# Patient Record
Sex: FEMALE | Race: BLACK OR AFRICAN AMERICAN | NOT HISPANIC OR LATINO | Employment: FULL TIME | URBAN - METROPOLITAN AREA
[De-identification: names, ages, dates, MRNs, and addresses within clinical notes are randomized per-mention and may not be internally consistent; named-entity substitution may affect disease eponyms.]

---

## 2017-11-10 ENCOUNTER — TRANSCRIBE ORDERS (OUTPATIENT)
Dept: ADMINISTRATIVE | Facility: HOSPITAL | Age: 52
End: 2017-11-10

## 2017-11-10 DIAGNOSIS — Z12.31 VISIT FOR SCREENING MAMMOGRAM: Primary | ICD-10-CM

## 2017-12-14 ENCOUNTER — APPOINTMENT (OUTPATIENT)
Dept: PREADMISSION TESTING | Facility: HOSPITAL | Age: 52
End: 2017-12-14

## 2017-12-26 ENCOUNTER — HOSPITAL ENCOUNTER (OUTPATIENT)
Dept: MAMMOGRAPHY | Facility: HOSPITAL | Age: 52
Discharge: HOME | End: 2017-12-26
Admitting: NURSE PRACTITIONER

## 2017-12-26 DIAGNOSIS — Z12.31 VISIT FOR SCREENING MAMMOGRAM: ICD-10-CM

## 2017-12-26 PROCEDURE — 77063 BREAST TOMOSYNTHESIS BI: CPT

## 2017-12-26 PROCEDURE — 77063 BREAST TOMOSYNTHESIS BI: CPT | Performed by: RADIOLOGY

## 2017-12-26 PROCEDURE — G0202 SCR MAMMO BI INCL CAD: HCPCS

## 2017-12-26 PROCEDURE — 77067 SCR MAMMO BI INCL CAD: CPT | Performed by: RADIOLOGY

## 2017-12-28 ENCOUNTER — OFFICE VISIT (OUTPATIENT)
Dept: FAMILY MEDICINE CLINIC | Facility: CLINIC | Age: 52
End: 2017-12-28

## 2017-12-28 ENCOUNTER — LAB (OUTPATIENT)
Dept: LAB | Facility: HOSPITAL | Age: 52
End: 2017-12-28

## 2017-12-28 VITALS
DIASTOLIC BLOOD PRESSURE: 78 MMHG | OXYGEN SATURATION: 97 % | HEIGHT: 65 IN | TEMPERATURE: 96.8 F | SYSTOLIC BLOOD PRESSURE: 108 MMHG | HEART RATE: 78 BPM | WEIGHT: 202 LBS | BODY MASS INDEX: 33.66 KG/M2

## 2017-12-28 DIAGNOSIS — H69.83 EUSTACHIAN TUBE DYSFUNCTION, BILATERAL: ICD-10-CM

## 2017-12-28 DIAGNOSIS — R79.89 LOW VITAMIN D LEVEL: ICD-10-CM

## 2017-12-28 DIAGNOSIS — E66.09 CLASS 1 OBESITY DUE TO EXCESS CALORIES WITHOUT SERIOUS COMORBIDITY WITH BODY MASS INDEX (BMI) OF 33.0 TO 33.9 IN ADULT: ICD-10-CM

## 2017-12-28 DIAGNOSIS — Z00.00 WELLNESS EXAMINATION: ICD-10-CM

## 2017-12-28 DIAGNOSIS — M51.36 DDD (DEGENERATIVE DISC DISEASE), LUMBAR: ICD-10-CM

## 2017-12-28 DIAGNOSIS — E78.5 DYSLIPIDEMIA: ICD-10-CM

## 2017-12-28 DIAGNOSIS — L65.9 ALOPECIA: ICD-10-CM

## 2017-12-28 DIAGNOSIS — Z00.00 WELLNESS EXAMINATION: Primary | ICD-10-CM

## 2017-12-28 DIAGNOSIS — Z11.59 NEED FOR HEPATITIS C SCREENING TEST: ICD-10-CM

## 2017-12-28 PROBLEM — H69.93 EUSTACHIAN TUBE DYSFUNCTION, BILATERAL: Status: ACTIVE | Noted: 2017-12-28

## 2017-12-28 PROBLEM — M51.369 DDD (DEGENERATIVE DISC DISEASE), LUMBAR: Status: ACTIVE | Noted: 2017-12-28

## 2017-12-28 PROBLEM — E66.811 CLASS 1 OBESITY DUE TO EXCESS CALORIES WITHOUT SERIOUS COMORBIDITY WITH BODY MASS INDEX (BMI) OF 33.0 TO 33.9 IN ADULT: Status: ACTIVE | Noted: 2017-12-28

## 2017-12-28 LAB
25(OH)D3 SERPL-MCNC: 98.4 NG/ML
ALBUMIN SERPL-MCNC: 4.4 G/DL (ref 3.2–4.8)
ALBUMIN/GLOB SERPL: 1.4 G/DL (ref 1.5–2.5)
ALP SERPL-CCNC: 67 U/L (ref 25–100)
ALT SERPL W P-5'-P-CCNC: 19 U/L (ref 7–40)
ANION GAP SERPL CALCULATED.3IONS-SCNC: 7 MMOL/L (ref 3–11)
ARTICHOKE IGE QN: 125 MG/DL (ref 0–130)
AST SERPL-CCNC: 19 U/L (ref 0–33)
BILIRUB SERPL-MCNC: 0.7 MG/DL (ref 0.3–1.2)
BUN BLD-MCNC: 17 MG/DL (ref 9–23)
BUN/CREAT SERPL: 17 (ref 7–25)
CALCIUM SPEC-SCNC: 9.5 MG/DL (ref 8.7–10.4)
CHLORIDE SERPL-SCNC: 105 MMOL/L (ref 99–109)
CHOLEST SERPL-MCNC: 209 MG/DL (ref 0–200)
CO2 SERPL-SCNC: 27 MMOL/L (ref 20–31)
CREAT BLD-MCNC: 1 MG/DL (ref 0.6–1.3)
GFR SERPL CREATININE-BSD FRML MDRD: 71 ML/MIN/1.73
GLOBULIN UR ELPH-MCNC: 3.2 GM/DL
GLUCOSE BLD-MCNC: 90 MG/DL (ref 70–100)
HCV AB SER DONR QL: NORMAL
HDLC SERPL-MCNC: 67 MG/DL (ref 40–60)
POTASSIUM BLD-SCNC: 4.8 MMOL/L (ref 3.5–5.5)
PROT SERPL-MCNC: 7.6 G/DL (ref 5.7–8.2)
SODIUM BLD-SCNC: 139 MMOL/L (ref 132–146)
TRIGL SERPL-MCNC: 99 MG/DL (ref 0–150)

## 2017-12-28 PROCEDURE — 36415 COLL VENOUS BLD VENIPUNCTURE: CPT

## 2017-12-28 PROCEDURE — 80061 LIPID PANEL: CPT

## 2017-12-28 PROCEDURE — 99396 PREV VISIT EST AGE 40-64: CPT | Performed by: NURSE PRACTITIONER

## 2017-12-28 PROCEDURE — 86803 HEPATITIS C AB TEST: CPT | Performed by: NURSE PRACTITIONER

## 2017-12-28 PROCEDURE — 80053 COMPREHEN METABOLIC PANEL: CPT

## 2017-12-28 PROCEDURE — 82306 VITAMIN D 25 HYDROXY: CPT

## 2017-12-28 RX ORDER — SPIRONOLACTONE 50 MG/1
150 TABLET, FILM COATED ORAL DAILY
COMMUNITY
Start: 2017-12-15

## 2017-12-28 RX ORDER — TERCONAZOLE 80 MG/1
SUPPOSITORY VAGINAL AS NEEDED
COMMUNITY
Start: 2017-12-02 | End: 2020-01-03

## 2017-12-28 RX ORDER — FLUTICASONE PROPIONATE 50 MCG
1 SPRAY, SUSPENSION (ML) NASAL DAILY
Qty: 3 BOTTLE | Refills: 3 | Status: SHIPPED | OUTPATIENT
Start: 2017-12-28 | End: 2020-02-21 | Stop reason: SDUPTHER

## 2017-12-28 RX ORDER — ESTRADIOL 0.05 MG/D
FILM, EXTENDED RELEASE TRANSDERMAL
COMMUNITY
Start: 2017-12-02

## 2017-12-28 RX ORDER — FLUTICASONE PROPIONATE 50 MCG
SPRAY, SUSPENSION (ML) NASAL
COMMUNITY
Start: 2017-10-10 | End: 2017-12-28 | Stop reason: SDUPTHER

## 2017-12-28 RX ORDER — CLOBETASOL PROPIONATE 0.46 MG/ML
SOLUTION TOPICAL
COMMUNITY
Start: 2017-12-15 | End: 2018-08-13

## 2017-12-28 NOTE — PROGRESS NOTES
"      Patient Care Team:  ALEJA Garcia as PCP - General (Family Medicine)     Chief complaint: Patient is in today for a physical     Patient is a 52 y.o. female who presents for her yearly physical exam.     HPI   Pt in for yearly physical. Denies c/o.    Review of Systems   Constitutional: Negative for appetite change, chills, fatigue and fever.   HENT: Positive for congestion. Negative for ear pain, rhinorrhea, sinus pressure and sore throat.    Eyes: Negative for itching and visual disturbance.   Respiratory: Negative for cough, shortness of breath and wheezing.    Cardiovascular: Negative for chest pain, palpitations and leg swelling.   Gastrointestinal: Negative for abdominal pain, constipation, diarrhea, nausea and vomiting.   Endocrine: Negative for cold intolerance and heat intolerance.   Genitourinary: Negative for difficulty urinating, dysuria and hematuria.   Musculoskeletal: Positive for arthralgias (right shoulder) and back pain ( Low back pain, into right leg, Tylenol helps some, Ibuprofen hurts stomach, \"Needs surg. not want at this time\". ). Negative for joint swelling and myalgias.   Skin: Positive for rash. Negative for wound.   Allergic/Immunologic: Negative for environmental allergies and food allergies.   Neurological: Positive for numbness (right leg). Negative for dizziness and headaches.   Hematological: Negative for adenopathy. Does not bruise/bleed easily.   Psychiatric/Behavioral: Positive for sleep disturbance (back pain, with numbness into right foot). The patient is not nervous/anxious.       History  Past Medical History:   Diagnosis Date   • DDD (degenerative disc disease), lumbar    • History of gallstones    • Obesity       Past Surgical History:   Procedure Laterality Date   • TOE SURGERY Left     Fifth toe      Allergies   Allergen Reactions   • Mobic [Meloxicam] GI Intolerance   • Nsaids GI Intolerance      Family History   Problem Relation Age of Onset   • Lung " cancer Mother    • Stomach cancer Father    • Heart disease Brother    • Breast cancer Neg Hx    • Ovarian cancer Neg Hx      Social History     Social History   • Marital status: Single     Spouse name: N/A   • Number of children: N/A   • Years of education: N/A     Occupational History   • Not on file.     Social History Main Topics   • Smoking status: Never Smoker   • Smokeless tobacco: Never Used   • Alcohol use Yes      Comment: Rarely   • Drug use: No   • Sexual activity: Defer     Other Topics Concern   • Not on file     Social History Narrative        Current Outpatient Prescriptions:   •  clobetasol (TEMOVATE) 0.05 % external solution, , Disp: , Rfl:   •  estradiol (MINIVELLE, VIVELLE-DOT) 0.05 MG/24HR patch, , Disp: , Rfl:   •  fluticasone (FLONASE) 50 MCG/ACT nasal spray, 1 spray into each nostril Daily., Disp: 3 bottle, Rfl: 3  •  progesterone (PROMETRIUM) 100 MG capsule, , Disp: , Rfl:   •  spironolactone (ALDACTONE) 50 MG tablet, , Disp: , Rfl:   •  terconazole (TERAZOL 3) 80 MG vaginal suppository, , Disp: , Rfl:     Immunizations   N/A   Prescribed/Refused   Date     Td/Tdap  (Booster Q 10 yrs)   [x]           Prescribed    []     Refused        []           Flu  (Yearly)   [x]        Prescribed    []     Refused        []           Pneumovax  (1 yr after Prevnar)   [x]        Prescribed    []     Refused        []           Prevnar 13  (1 yr after Pneumo)   [x]        Prescribed    []     Refused        []           Hep B     [x]        Prescribed    []     Refused        []           Zostavax  (Age 60 and older)   [x]        Prescribed    []     Refused        []           Immunization History   Administered Date(s) Administered   • Tdap 01/01/2010     Health Maintenance   Topic Date Due   • MAMMOGRAM  12/23/2018   • TDAP/TD VACCINES (2 - Td) 01/01/2020   • PAP SMEAR  12/28/2020   • COLONOSCOPY  01/01/2026   • HEPATITIS C SCREENING  Completed   • INFLUENZA VACCINE  Completed        Diabetes  [] Yes  " [x] No   N/A      Date     Eye Exam     []            []   Complete         Date:  Where:       Foot Exam     []         []   Complete          Obesity Counseling     []       []   Complete     No results found for: HGBA1C, MICROALBUR    Additional Testing      Date     Colorectal Screening       [x]   N/A   []   Complete         Date:    Where:       Pap      [x]   N/A   []   Complete   Date:    Where:       Mammogram        [x]   N/A   []   Complete Date:    Where:     PSA  (Over age 50)    [x]   N/A   []   Complete   Date:    Where:     US Aorta  (For male smokers, age 65)     []   N/A   []   Complete   Date:    Where:     CT for Smoker  (Age 55-75, 30 pk yr)    [x]   N/A   []   Complete   Date:    Where:     Bone Density/DEXA      [x]   N/A   []   Complete   Date:    Follow-up:     Hep. C  ( 2237-0523)      []   N/A   [x]   Complete   Date:    Where:   No results found for this or any previous visit.         /78 (BP Location: Left arm, Patient Position: Sitting, Cuff Size: Large Adult)  Pulse 78  Temp 96.8 °F (36 °C) (Temporal Artery )   Ht 165.1 cm (65\")  Wt 91.6 kg (202 lb)  LMP 2017  SpO2 97%  BMI 33.61 kg/m2      Physical Exam   Constitutional: She is oriented to person, place, and time. She appears well-developed and well-nourished. No distress.   HENT:   Head: Normocephalic and atraumatic.   Right Ear: External ear normal.   Left Ear: External ear normal.   Nose: Nose normal.   Mouth/Throat: Oropharynx is clear and moist.   Eyes: Conjunctivae and EOM are normal. Pupils are equal, round, and reactive to light. Right eye exhibits no discharge. Left eye exhibits no discharge. No scleral icterus.   Neck: Normal range of motion. Neck supple. No JVD (no bruits) present. No tracheal deviation present. No thyromegaly present.   Cardiovascular: Normal rate, regular rhythm, normal heart sounds and intact distal pulses.  Exam reveals no gallop and no friction rub.    No murmur " heard.  Pulmonary/Chest: Effort normal and breath sounds normal. No respiratory distress. She has no wheezes. She has no rales.   Abdominal: Soft. Normal appearance and bowel sounds are normal. She exhibits no distension and no mass. There is no hepatosplenomegaly. There is no tenderness. No hernia.   Musculoskeletal: Normal range of motion. She exhibits tenderness (low back). She exhibits no edema or deformity.   Lymphadenopathy:     She has no cervical adenopathy.   Neurological: She is alert and oriented to person, place, and time. She has normal strength. She displays normal reflexes. A cranial nerve deficit is present. No sensory deficit (decreased on right).   Reflex Scores:       Patellar reflexes are 1+ on the right side and 2+ on the left side.  Skin: Skin is warm and dry. No rash noted. She is not diaphoretic. No erythema. No pallor.   Psychiatric: She has a normal mood and affect. Her behavior is normal. Thought content normal.   Nursing note and vitals reviewed.          Counseling provided on Back pain.    Diagnoses and all orders for this visit:    Wellness examination  -     Comprehensive Metabolic Panel; Future    DDD (degenerative disc disease), lumbar    Class 1 obesity due to excess calories without serious comorbidity with body mass index (BMI) of 33.0 to 33.9 in adult  -     Comprehensive Metabolic Panel; Future    Alopecia    Need for hepatitis C screening test  -     Hepatitis C Antibody    Dyslipidemia  -     Lipid Panel; Future    Eustachian tube dysfunction, bilateral  -     fluticasone (FLONASE) 50 MCG/ACT nasal spray; 1 spray into each nostril Daily.    Low vitamin D level  -     Vitamin D 25 Hydroxy; Future    Other orders  -     estradiol (MINIVELLE, VIVELLE-DOT) 0.05 MG/24HR patch;   -     progesterone (PROMETRIUM) 100 MG capsule;   -     clobetasol (TEMOVATE) 0.05 % external solution;   -     spironolactone (ALDACTONE) 50 MG tablet;   -     terconazole (TERAZOL 3) 80 MG vaginal  suppository;   -     Discontinue: fluticasone (FLONASE) 50 MCG/ACT nasal spray;     RV PRN for Eustachian tube dysfunction and Allergic Rhinitis  Patient was encouraged to keep me informed of any acute changes, lack of improvement, or any new concerning symptoms.Plan of care discussed with pt. They verbalized understanding and agreement.   Cont meds for alopecia.      Star Garcia, ALEJA   12/28/2017   9:29 AM

## 2018-01-02 ENCOUNTER — TELEPHONE (OUTPATIENT)
Dept: FAMILY MEDICINE CLINIC | Facility: CLINIC | Age: 53
End: 2018-01-02

## 2018-01-02 NOTE — TELEPHONE ENCOUNTER
PATIENT HAS CALLED LOOKING ON HER MY CHART AND WOULD LIKE TO DISCUSS WHAT SHE HAS SEEN ON HER LIPID PANEL    PLEASE CALL [phone]

## 2018-01-02 NOTE — TELEPHONE ENCOUNTER
Called pt to discuss labs with her, received no answer, lmovm for pt to call us back, tried number left by patient [phone] and received no answer or voicemail to leave a message

## 2018-01-02 NOTE — TELEPHONE ENCOUNTER
"Total cholesterol is mildly elevated, Her \"good cholesterol\", the HDL is up to 62, but we like it this high. This helps to lower the \"Bad cholesterol\", the LDL.  "

## 2018-07-03 ENCOUNTER — TELEPHONE (OUTPATIENT)
Dept: FAMILY MEDICINE CLINIC | Facility: CLINIC | Age: 53
End: 2018-07-03

## 2018-07-03 DIAGNOSIS — M25.511 ACUTE PAIN OF RIGHT SHOULDER: Primary | ICD-10-CM

## 2018-07-03 NOTE — TELEPHONE ENCOUNTER
PER PT CALLED STATED SHE HAS RIGHT SHOULDER PAIN. PATIENTIS NOT SURE IF SHE SHOULD COME IN FOR A VISIT OR IF WE CAN REFER HER TO GET AN X-RAY AND THEN SHE CAN COME IF FOR A OFFICE VISIT. PLEASE CALL PT BACK ON HER WORK PHONE TO ADVISE. [phone]

## 2018-07-03 NOTE — TELEPHONE ENCOUNTER
Called patient and advised per Star that she may have xray done anytime and we will follow up with results.  Patient directed on how where main registration is.  Pain states she understands.

## 2018-07-17 ENCOUNTER — TELEPHONE (OUTPATIENT)
Dept: FAMILY MEDICINE CLINIC | Facility: CLINIC | Age: 53
End: 2018-07-17

## 2018-07-17 ENCOUNTER — APPOINTMENT (OUTPATIENT)
Dept: GENERAL RADIOLOGY | Facility: HOSPITAL | Age: 53
End: 2018-07-17

## 2018-07-17 NOTE — TELEPHONE ENCOUNTER
Pt called. Said that she went to the imaging center and that she was told that it was $424.00 to get one. Pt is wanting a referral to get an Xray on her shoulder. Pt says she cant lift, or move it. Pt has not been seen.

## 2018-07-17 NOTE — TELEPHONE ENCOUNTER
"PATIENT WOULD LIKE TO GO TO THE [facility]. DOES SHE NEED A NEW REFERRAL?     PLEASE FAX REFERRAL WITH \"PLEASE CALL PATIENT TO SCHEDULE\" ADDED TO THIS, FAX: [phone]    PATIENT REQUESTING A CALL WHEN THIS IS DONE. GAVE AUTHORIZATION TO LEAVE A MESSAGE. [phone]      "

## 2018-07-17 NOTE — TELEPHONE ENCOUNTER
I printed order, she can take to [facility]. She needs a visit after results available to discuss treatment.  LH

## 2018-07-25 ENCOUNTER — TELEPHONE (OUTPATIENT)
Dept: FAMILY MEDICINE CLINIC | Facility: CLINIC | Age: 53
End: 2018-07-25

## 2018-07-25 DIAGNOSIS — M25.511 ACUTE PAIN OF RIGHT SHOULDER: Primary | ICD-10-CM

## 2018-07-26 NOTE — TELEPHONE ENCOUNTER
Notes recorded by ALEJA Garcia on 7/25/2018 at 1:45 PM EDT  She has calcific tendonitis of her shoulder, They use steroid injection freq for this. I will refer her to Ortho for this. LH

## 2018-07-27 NOTE — TELEPHONE ENCOUNTER
Called and informed pt of results and recommendations. Pt verbalized understanding and asked if that was the only option? I informed pt I was not sure about other options but that is something ortho could discuss with her if she wanted to go in that direction. Pt stated she would like to move forward with the referral. Pt had no further questions.

## 2018-08-13 ENCOUNTER — OFFICE VISIT (OUTPATIENT)
Dept: ORTHOPEDIC SURGERY | Facility: CLINIC | Age: 53
End: 2018-08-13

## 2018-08-13 VITALS — BODY MASS INDEX: 32.74 KG/M2 | HEART RATE: 97 BPM | OXYGEN SATURATION: 98 % | HEIGHT: 66 IN | WEIGHT: 203.71 LBS

## 2018-08-13 DIAGNOSIS — M75.91 SUPRASPINATUS TENDINITIS, RIGHT: Primary | ICD-10-CM

## 2018-08-13 PROCEDURE — 99203 OFFICE O/P NEW LOW 30 MIN: CPT | Performed by: ORTHOPAEDIC SURGERY

## 2018-08-13 NOTE — PROGRESS NOTES
[facility] Note    Subjective     Chief Complaint   Patient presents with   • Right Shoulder - Pain        HPI      Tammi Richardson is a 53 y.o. female.  She complains of right shoulder pain.  She injured it a little bit 2 years ago in the interim be able to get completely better.  Recently insidious onset of increasing pain over the past 2 months.  She takes ibuprofen.  No recent trauma.  Pain is 2 out of 10.  It is dull and shooting.  Worse with lifting overhead and better with rest.        Past Medical History:   Diagnosis Date   • DDD (degenerative disc disease), lumbar    • History of gallstones    • Obesity       Past Surgical History:   Procedure Laterality Date   • CHOLECYSTECTOMY     • TOE SURGERY Left     Fifth toe      Family History   Problem Relation Age of Onset   • Lung cancer Mother    • Stomach cancer Father    • Heart disease Brother    • Breast cancer Neg Hx    • Ovarian cancer Neg Hx      Social History     Social History   • Marital status: Single     Spouse name: N/A   • Number of children: N/A   • Years of education: N/A     Occupational History   • Not on file.     Social History Main Topics   • Smoking status: Never Smoker   • Smokeless tobacco: Never Used   • Alcohol use Yes      Comment: Rarely   • Drug use: No   • Sexual activity: Defer     Other Topics Concern   • Not on file     Social History Narrative   • No narrative on file      Current Outpatient Prescriptions on File Prior to Visit   Medication Sig Dispense Refill   • fluticasone (FLONASE) 50 MCG/ACT nasal spray 1 spray into each nostril Daily. 3 bottle 3   • progesterone (PROMETRIUM) 100 MG capsule      • spironolactone (ALDACTONE) 50 MG tablet      • estradiol (MINIVELLE, VIVELLE-DOT) 0.05 MG/24HR patch      • terconazole (TERAZOL 3) 80 MG vaginal suppository As Needed.     • [DISCONTINUED] clobetasol (TEMOVATE) 0.05 % external solution        No current facility-administered medications on file prior  "to visit.       Allergies   Allergen Reactions   • Mobic [Meloxicam] GI Intolerance   • Nsaids GI Intolerance        The following portions of the patient's history were reviewed and updated as appropriate: allergies, current medications, past family history, past medical history, past social history, past surgical history and problem list.    Review of Systems   Constitutional: Negative.    HENT: Negative.    Eyes: Negative.    Respiratory: Negative.    Cardiovascular: Negative.    Gastrointestinal: Negative.    Endocrine: Negative.    Genitourinary: Negative.    Musculoskeletal: Positive for arthralgias and back pain.   Skin: Negative.    Allergic/Immunologic: Positive for environmental allergies.   Neurological: Negative.    Hematological: Negative.    Psychiatric/Behavioral: Negative.         Objective      Physical Exam  Pulse 97   Ht 167.6 cm (66\")   Wt 92.4 kg (203 lb 11.3 oz)   SpO2 98%   BMI 32.88 kg/m²     Body mass index is 32.88 kg/m².        GENERAL APPEARANCE: awake, alert & oriented x 3, in no acute distress and well developed, well nourished  PSYCH: normal mood and affect  LUNGS:  breathing nonlabored, no wheezing  EYES: sclera anicteric, pupils equal  CARDIOVASCULAR: palpable pulses dorsalis pedis, palpable posterior tibial bilaterally. Capillary refill less than 2 seconds  INTEGUMENTARY: skin intact, no clubbing, cyanosis  NEUROLOGIC:  Normal Sensation and reflexes             Ortho Exam  Musculoskeletal   Upper Extremity   Right Shoulder     Inspection and Palpation:     Tenderness - none    Crepitus - none    Sensation is normal    Examination reveals no ecchymosis.      Strength and Tone:    Supraspinatus,  Infraspinatus - 5/5    Subscapularis - 5/5    Deltoid - 5/5     Range of Motion   Left shoulder:    Internal Rotation: ROM - T7    External Rotation: AROM - 80 degrees    Elevation through flexion: AROM - 180 degrees    Right Shoulder:    Internal Rotation: ROM - T7    External Rotation: " AROM - 80 degrees    Elevation through flexion: AROM - 180 degrees     Abduction -180 degrees     Instability   Right shoulder    Sulcus sign negative    Apprehension test negative    Indira relocation test negative    Jerk test negative   Impingement   Right shoulder    Landeros impingement test positive    Neer impingement test positive   Functional Testing   Right shoulder    AC crossover adduction test negative    Abdominal compression test negative    Lift-off sign negative    Speed's test negative    Garcia's test negative    Horriblower's sign negative       Imaging/Studies  Imaging Results (last 7 days)     ** No results found for the last 168 hours. **      I reviewed her x-rays from 2 weeks ago at [facility] which are negative.    Assessment/Plan        ICD-10-CM ICD-9-CM   1. Supraspinatus tendinitis, right M75.91 726.10       Orders Placed This Encounter   Procedures   • Ambulatory Referral to Physical Therapy        We discussed different treatment options.  I offered her physical therapy and injection and anti-inflammatories.  She is already taking ibuprofen 800 mg as prescribed.  She wants to try physical therapy.  She does not want an injection.    Medical Decision Making  Management Options : over-the-counter medicine, prescription/IM medicine and physical/occupational therapy  Data/Risk: radiology tests and independent visualization of imaging, lab tests, or EMG/NCV    Franc Castano MD  08/13/18  8:50 AM         EMR Dragon/Transcription disclaimer:  Much of this encounter note is an electronic transcription of spoken language to printed text. Electronic transcription of spoken language may permit erroneous, or at times, nonsensical words or phrases to be inadvertently transcribed. Although I have reviewed the note for such errors, some may still exist.

## 2018-08-28 ENCOUNTER — TELEPHONE (OUTPATIENT)
Dept: FAMILY MEDICINE CLINIC | Facility: CLINIC | Age: 53
End: 2018-08-28

## 2018-08-28 NOTE — TELEPHONE ENCOUNTER
PT CALLED AND WANTED TO KNOW WHAT SHE CAN GET OVER THE COUNTER THAT WOULD HELP TO CLEAR UP A SINUS INFECTION.    PLEASE CALL PT BACK [phone] BEFORE 4:00  IF AFTER 4:00 PLEASE CALL HER [phone]

## 2018-08-29 NOTE — TELEPHONE ENCOUNTER
Called and informed pt of recommendations. Pt verbalized understanding and had no further questions.

## 2018-08-29 NOTE — TELEPHONE ENCOUNTER
Chart says she is using Flonase, Cont this, start Claritin 10 mg QD and use 12 hr Sudafed (Have to get behind the counter) QAM, PRN nasal congestion, Can also use a Netti Pot 1-2 times per day as needed.

## 2018-11-21 ENCOUNTER — TRANSCRIBE ORDERS (OUTPATIENT)
Dept: ADMINISTRATIVE | Facility: HOSPITAL | Age: 53
End: 2018-11-21

## 2018-11-21 DIAGNOSIS — Z12.31 VISIT FOR SCREENING MAMMOGRAM: Primary | ICD-10-CM

## 2018-12-31 ENCOUNTER — OFFICE VISIT (OUTPATIENT)
Dept: FAMILY MEDICINE CLINIC | Facility: CLINIC | Age: 53
End: 2018-12-31

## 2018-12-31 ENCOUNTER — LAB (OUTPATIENT)
Dept: LAB | Facility: HOSPITAL | Age: 53
End: 2018-12-31

## 2018-12-31 VITALS
SYSTOLIC BLOOD PRESSURE: 124 MMHG | BODY MASS INDEX: 32.72 KG/M2 | DIASTOLIC BLOOD PRESSURE: 76 MMHG | TEMPERATURE: 98.6 F | RESPIRATION RATE: 18 BRPM | HEART RATE: 78 BPM | OXYGEN SATURATION: 98 % | HEIGHT: 66 IN | WEIGHT: 203.6 LBS

## 2018-12-31 DIAGNOSIS — Z23 NEED FOR VACCINATION AGAINST HEPATITIS A: ICD-10-CM

## 2018-12-31 DIAGNOSIS — H61.21 IMPACTED CERUMEN OF RIGHT EAR: ICD-10-CM

## 2018-12-31 DIAGNOSIS — H69.83 EUSTACHIAN TUBE DYSFUNCTION, BILATERAL: ICD-10-CM

## 2018-12-31 DIAGNOSIS — E66.09 CLASS 1 OBESITY DUE TO EXCESS CALORIES WITHOUT SERIOUS COMORBIDITY WITH BODY MASS INDEX (BMI) OF 33.0 TO 33.9 IN ADULT: ICD-10-CM

## 2018-12-31 DIAGNOSIS — Z00.00 HEALTH CARE MAINTENANCE: Primary | ICD-10-CM

## 2018-12-31 DIAGNOSIS — Z00.00 HEALTH CARE MAINTENANCE: ICD-10-CM

## 2018-12-31 LAB
ALBUMIN SERPL-MCNC: 4.39 G/DL (ref 3.2–4.8)
ALBUMIN/GLOB SERPL: 1.9 G/DL (ref 1.5–2.5)
ALP SERPL-CCNC: 60 U/L (ref 25–100)
ALT SERPL W P-5'-P-CCNC: 18 U/L (ref 7–40)
ANION GAP SERPL CALCULATED.3IONS-SCNC: 5 MMOL/L (ref 3–11)
ARTICHOKE IGE QN: 117 MG/DL (ref 0–130)
AST SERPL-CCNC: 21 U/L (ref 0–33)
BILIRUB BLD-MCNC: NEGATIVE MG/DL
BILIRUB SERPL-MCNC: 0.6 MG/DL (ref 0.3–1.2)
BUN BLD-MCNC: 14 MG/DL (ref 9–23)
BUN/CREAT SERPL: 13.3 (ref 7–25)
CALCIUM SPEC-SCNC: 9.6 MG/DL (ref 8.7–10.4)
CHLORIDE SERPL-SCNC: 107 MMOL/L (ref 99–109)
CHOLEST SERPL-MCNC: 191 MG/DL (ref 0–200)
CLARITY, POC: CLEAR
CO2 SERPL-SCNC: 25 MMOL/L (ref 20–31)
COLOR UR: ABNORMAL
CREAT BLD-MCNC: 1.05 MG/DL (ref 0.6–1.3)
GFR SERPL CREATININE-BSD FRML MDRD: 66 ML/MIN/1.73
GLOBULIN UR ELPH-MCNC: 2.3 GM/DL
GLUCOSE BLD-MCNC: 96 MG/DL (ref 70–100)
GLUCOSE UR STRIP-MCNC: NEGATIVE MG/DL
HDLC SERPL-MCNC: 66 MG/DL (ref 40–60)
KETONES UR QL: NEGATIVE
LEUKOCYTE EST, POC: ABNORMAL
NITRITE UR-MCNC: NEGATIVE MG/ML
PH UR: 6 [PH] (ref 5–8)
POTASSIUM BLD-SCNC: 4.7 MMOL/L (ref 3.5–5.5)
PROT SERPL-MCNC: 6.7 G/DL (ref 5.7–8.2)
PROT UR STRIP-MCNC: NEGATIVE MG/DL
RBC # UR STRIP: NEGATIVE /UL
SODIUM BLD-SCNC: 137 MMOL/L (ref 132–146)
SP GR UR: 1.02 (ref 1–1.03)
TRIGL SERPL-MCNC: 75 MG/DL (ref 0–150)
UROBILINOGEN UR QL: NORMAL

## 2018-12-31 PROCEDURE — 99396 PREV VISIT EST AGE 40-64: CPT | Performed by: NURSE PRACTITIONER

## 2018-12-31 PROCEDURE — 90471 IMMUNIZATION ADMIN: CPT | Performed by: NURSE PRACTITIONER

## 2018-12-31 PROCEDURE — 80053 COMPREHEN METABOLIC PANEL: CPT | Performed by: NURSE PRACTITIONER

## 2018-12-31 PROCEDURE — 81003 URINALYSIS AUTO W/O SCOPE: CPT | Performed by: NURSE PRACTITIONER

## 2018-12-31 PROCEDURE — 90632 HEPA VACCINE ADULT IM: CPT | Performed by: NURSE PRACTITIONER

## 2018-12-31 PROCEDURE — 80061 LIPID PANEL: CPT | Performed by: NURSE PRACTITIONER

## 2018-12-31 RX ORDER — IBUPROFEN 800 MG/1
800 TABLET ORAL EVERY 6 HOURS PRN
COMMUNITY
End: 2020-01-03 | Stop reason: SDUPTHER

## 2018-12-31 NOTE — PATIENT INSTRUCTIONS

## 2018-12-31 NOTE — PROGRESS NOTES
Patient Care Team:  Star Garcia APRN as PCP - General (Family Medicine)     Chief complaint: Patient is in today for a physical     Patient is a 53 y.o. female who presents for her yearly physical exam.     HPI Pt  In for yearly Physical. Has GYN doing pap and mammogram.     Right ear occasional muffled hearing, for quite awhile, 6 wks, then 3 days noted some right tooth pain, Pt is seeing dentist today.   Review of Systems   Constitutional: Negative for appetite change, chills, fatigue and fever.   HENT: Negative for congestion, ear pain (muffled hearing, right), rhinorrhea, sinus pressure and sore throat.    Eyes: Negative for itching and visual disturbance.   Respiratory: Negative for cough, shortness of breath and wheezing.    Cardiovascular: Negative for chest pain, palpitations and leg swelling.   Gastrointestinal: Negative for abdominal pain, constipation, diarrhea, nausea and vomiting.   Endocrine: Negative for cold intolerance and heat intolerance.   Genitourinary: Negative for difficulty urinating, dysuria, hematuria and menstrual problem.   Musculoskeletal: Positive for back pain. Negative for arthralgias, joint swelling and myalgias.   Skin: Negative for rash and wound.   Allergic/Immunologic: Negative for environmental allergies and food allergies.   Neurological: Positive for numbness (right leg). Negative for dizziness and headaches.   Hematological: Negative for adenopathy. Does not bruise/bleed easily.   Psychiatric/Behavioral: Positive for sleep disturbance (back). Negative for dysphoric mood. The patient is not nervous/anxious.       History  Past Medical History:   Diagnosis Date   • DDD (degenerative disc disease), lumbar    • History of gallstones    • Obesity       Past Surgical History:   Procedure Laterality Date   • CHOLECYSTECTOMY     • TOE SURGERY Left     Fifth toe      Allergies   Allergen Reactions   • Mobic [Meloxicam] GI Intolerance   • Nsaids GI Intolerance       Family History   Problem Relation Age of Onset   • Lung cancer Mother    • Stomach cancer Father    • Heart disease Brother    • Breast cancer Neg Hx    • Ovarian cancer Neg Hx      Social History     Socioeconomic History   • Marital status: Single     Spouse name: Not on file   • Number of children: Not on file   • Years of education: Not on file   • Highest education level: Not on file   Social Needs   • Financial resource strain: Not on file   • Food insecurity - worry: Not on file   • Food insecurity - inability: Not on file   • Transportation needs - medical: Not on file   • Transportation needs - non-medical: Not on file   Occupational History   • Not on file   Tobacco Use   • Smoking status: Never Smoker   • Smokeless tobacco: Never Used   Substance and Sexual Activity   • Alcohol use: Yes     Comment: Rarely   • Drug use: No   • Sexual activity: Defer   Other Topics Concern   • Not on file   Social History Narrative   • Not on file        Current Outpatient Medications:   •  estradiol (MINIVELLE, VIVELLE-DOT) 0.05 MG/24HR patch, , Disp: , Rfl:   •  fluticasone (FLONASE) 50 MCG/ACT nasal spray, 1 spray into each nostril Daily., Disp: 3 bottle, Rfl: 3  •  ibuprofen (ADVIL,MOTRIN) 800 MG tablet, Take 800 mg by mouth Every 6 (Six) Hours As Needed for Mild Pain ., Disp: , Rfl:   •  progesterone (PROMETRIUM) 100 MG capsule, , Disp: , Rfl:   •  spironolactone (ALDACTONE) 50 MG tablet, , Disp: , Rfl:   •  terconazole (TERAZOL 3) 80 MG vaginal suppository, As Needed., Disp: , Rfl:     Immunizations   N/A   Prescribed/Refused   Date     Td/Tdap  (Booster Q 10 yrs)   [x]           Prescribed    []     Refused        []           Flu  (Yearly)   [x]        Prescribed    []     Refused        []           Pneumovax  (1 yr after Prevnar)   [x]        Prescribed    []     Refused        []           Prevnar 13  (1 yr after Pneumo)   [x]        Prescribed    []     Refused        []           Hep B     [x]         Prescribed    []     Refused        []           Shingles  (Age 50 and older)   []        Prescribed    [x]     Refused        []           Immunization History   Administered Date(s) Administered   • Hepatitis A 2018   • Tdap 2010     Health Maintenance   Topic Date Due   • HEPATITIS A VACCINE ADULT (1 of 2) 1983   • ZOSTER VACCINE (1 of 2) 2015   • MAMMOGRAM  2019   • ANNUAL PHYSICAL  2020   • TDAP/TD VACCINES (2 - Td) 2020   • PAP SMEAR  2020   • COLONOSCOPY  2026   • HEPATITIS C SCREENING  Completed   • INFLUENZA VACCINE  Completed        Diabetes  [] Yes  [x] No   N/A      Date     Eye Exam     []             []   Complete     []   Recommended Date:  Where:       Foot Exam     []         []   Complete          Obesity Counseling     []       []   Complete     No results found for: HGBA1C, MICROALBUR    Additional Testing      Date     Colorectal Screening       [x]   N/A   []   Complete    []   Ordered     Date:    Where:       Pap      []   N/A   []   Complete   [x]   OB/GYN Date:    Where:       Mammogram        []   N/A   []   Complete   [x]   Ordered Date:    Where:     PSA  (Over age 50)    [x]   N/A   []   Complete   []   Ordered Date:    Where:     US Aorta  (For male smokers, age 65)     [x]   N/A   []   Complete   []   Ordered Date:    Where:     CT for Smoker  (Age 55-75, 30 pk yr)    [x]   N/A   []   Complete   []   Ordered Date:    Where:     Bone Density/DEXA      [x]   N/A   []   Complete   []   Ordered Date:    Follow-up:     Hep. C  ( 1317-9406)      [x]   N/A   []   Complete   []   Ordered Date:    Where:     Results for orders placed or performed in visit on 18   POCT urinalysis dipstick, automated   Result Value Ref Range    Color Madeline Yellow, Straw, Dark Yellow, Madeline    Clarity, UA Clear Clear    Specific Gravity  1.025 1.005 - 1.030    pH, Urine 6.0 5.0 - 8.0    Leukocytes Small (1+) (A) Negative    Nitrite, UA Negative  "Negative    Protein, POC Negative Negative mg/dL    Glucose, UA Negative Negative, 1000 mg/dL (3+) mg/dL    Ketones, UA Negative Negative    Urobilinogen, UA Normal Normal    Bilirubin Negative Negative    Blood, UA Negative Negative            /76   Pulse 78   Temp 98.6 °F (37 °C) (Oral)   Resp 18   Ht 167.6 cm (65.98\")   Wt 92.4 kg (203 lb 9.6 oz)   SpO2 98%   BMI 32.88 kg/m²       Physical Exam   Constitutional: She is oriented to person, place, and time. She appears well-developed and well-nourished. No distress.   HENT:   Head: Normocephalic and atraumatic.   Right Ear: External ear normal. A foreign body (wax) is present.   Left Ear: External ear normal. A middle ear effusion is present.   Nose: Nose normal.   Mouth/Throat: Oropharynx is clear and moist.   Eyes: Conjunctivae and EOM are normal. Pupils are equal, round, and reactive to light. Right eye exhibits no discharge. Left eye exhibits no discharge. No scleral icterus.   Neck: Normal range of motion. Neck supple. No JVD (no bruits) present. No tracheal deviation present. No thyromegaly present.   Cardiovascular: Normal rate, regular rhythm, normal heart sounds and intact distal pulses. Exam reveals no gallop and no friction rub.   No murmur heard.  Pulmonary/Chest: Effort normal and breath sounds normal. No respiratory distress. She has no wheezes. She has no rales.   Abdominal: Soft. Normal appearance and bowel sounds are normal. She exhibits no distension and no mass. There is no hepatosplenomegaly. There is no tenderness. No hernia.   Genitourinary:   Genitourinary Comments: Breast and GYN exam to OB/GYN   Musculoskeletal: Normal range of motion. She exhibits no edema, tenderness or deformity.   Lymphadenopathy:     She has no cervical adenopathy.   Neurological: She is alert and oriented to person, place, and time. She has normal reflexes. She displays normal reflexes.   Skin: Skin is warm and dry. No rash noted. She is not diaphoretic. " No erythema. No pallor.   Psychiatric: She has a normal mood and affect. Her behavior is normal. Thought content normal.   Nursing note and vitals reviewed.           Counseling provided on weight management.    Diagnoses and all orders for this visit:    Health care maintenance  -     POCT urinalysis dipstick, automated  -     Comprehensive Metabolic Panel; Future  -     Lipid Panel; Future    Class 1 obesity due to excess calories without serious comorbidity with body mass index (BMI) of 33.0 to 33.9 in adult    Eustachian tube dysfunction, bilateral    Impacted cerumen of right ear    Need for vaccination against hepatitis A    Other orders  -     ibuprofen (ADVIL,MOTRIN) 800 MG tablet; Take 800 mg by mouth Every 6 (Six) Hours As Needed for Mild Pain .  -     Hepatitis A Vaccine Adult IM       Star Garcia, ALEJA   12/31/2018   12:39 PM

## 2019-01-08 ENCOUNTER — TELEPHONE (OUTPATIENT)
Dept: FAMILY MEDICINE CLINIC | Facility: CLINIC | Age: 54
End: 2019-01-08

## 2019-01-08 NOTE — TELEPHONE ENCOUNTER
Small amount of Leukocytes are common in women. Does she have any sx of UTI, burning, discharge, frequency, etc

## 2019-01-08 NOTE — TELEPHONE ENCOUNTER
PT CALLING FOR HER LAB RESULTS.  [facility] AND HAS A QUESTIONS ABOUT Leukocytes IN HER URINE.

## 2019-01-11 ENCOUNTER — HOSPITAL ENCOUNTER (OUTPATIENT)
Dept: MAMMOGRAPHY | Facility: HOSPITAL | Age: 54
Discharge: HOME | End: 2019-01-11
Admitting: NURSE PRACTITIONER

## 2019-01-11 DIAGNOSIS — Z12.31 VISIT FOR SCREENING MAMMOGRAM: ICD-10-CM

## 2019-01-11 PROCEDURE — 77067 SCR MAMMO BI INCL CAD: CPT | Performed by: RADIOLOGY

## 2019-01-11 PROCEDURE — 77063 BREAST TOMOSYNTHESIS BI: CPT | Performed by: RADIOLOGY

## 2019-01-11 PROCEDURE — 77063 BREAST TOMOSYNTHESIS BI: CPT

## 2019-01-11 PROCEDURE — 77067 SCR MAMMO BI INCL CAD: CPT

## 2019-01-18 ENCOUNTER — TELEPHONE (OUTPATIENT)
Dept: FAMILY MEDICINE CLINIC | Facility: CLINIC | Age: 54
End: 2019-01-18

## 2019-01-23 ENCOUNTER — TELEPHONE (OUTPATIENT)
Dept: FAMILY MEDICINE CLINIC | Facility: CLINIC | Age: 54
End: 2019-01-23

## 2019-01-23 NOTE — TELEPHONE ENCOUNTER
"Nazia called on behalf of pt about a claim from 12/31/2018 that has not gone through due to an \"invalid or cancelled provider number\" , Star is not under the tax ID number     Nazia requesting a call back       Case # : [id number]  "

## 2019-01-24 NOTE — TELEPHONE ENCOUNTER
Spoke to Lenard.     According to him, the labs done on 12/31/18 were denied because the labs were filed under the tax ID # [id number] (which is [facility]).    Per Nazia's records, this patient's plan does not cover [facility] but it does cover visits in our office.     12/31/18 office visit was not denied. It was filed with the Tax ID [id number] (our office).

## 2019-06-10 ENCOUNTER — TELEPHONE (OUTPATIENT)
Dept: FAMILY MEDICINE CLINIC | Facility: CLINIC | Age: 54
End: 2019-06-10

## 2019-06-10 NOTE — TELEPHONE ENCOUNTER
LVM explaining that the patient can return close to the end of the month. Office # given incase there are further questions.

## 2019-07-05 ENCOUNTER — CLINICAL SUPPORT (OUTPATIENT)
Dept: FAMILY MEDICINE CLINIC | Facility: CLINIC | Age: 54
End: 2019-07-05

## 2019-07-05 DIAGNOSIS — Z23 NEED FOR HEPATITIS A VACCINATION: Primary | ICD-10-CM

## 2019-07-05 PROCEDURE — 90632 HEPA VACCINE ADULT IM: CPT | Performed by: NURSE PRACTITIONER

## 2019-07-05 PROCEDURE — 90471 IMMUNIZATION ADMIN: CPT | Performed by: NURSE PRACTITIONER

## 2019-12-30 ENCOUNTER — TRANSCRIBE ORDERS (OUTPATIENT)
Dept: ADMINISTRATIVE | Facility: HOSPITAL | Age: 54
End: 2019-12-30

## 2019-12-30 DIAGNOSIS — Z12.31 VISIT FOR SCREENING MAMMOGRAM: Primary | ICD-10-CM

## 2020-01-03 ENCOUNTER — TELEPHONE (OUTPATIENT)
Dept: FAMILY MEDICINE CLINIC | Facility: CLINIC | Age: 55
End: 2020-01-03

## 2020-01-03 ENCOUNTER — OFFICE VISIT (OUTPATIENT)
Dept: FAMILY MEDICINE CLINIC | Facility: CLINIC | Age: 55
End: 2020-01-03

## 2020-01-03 ENCOUNTER — LAB (OUTPATIENT)
Dept: LAB | Facility: HOSPITAL | Age: 55
End: 2020-01-03

## 2020-01-03 VITALS
HEART RATE: 98 BPM | DIASTOLIC BLOOD PRESSURE: 76 MMHG | WEIGHT: 205.2 LBS | SYSTOLIC BLOOD PRESSURE: 118 MMHG | HEIGHT: 66 IN | BODY MASS INDEX: 32.98 KG/M2 | OXYGEN SATURATION: 98 %

## 2020-01-03 DIAGNOSIS — Z00.00 WELLNESS EXAMINATION: ICD-10-CM

## 2020-01-03 DIAGNOSIS — M51.36 DDD (DEGENERATIVE DISC DISEASE), LUMBAR: ICD-10-CM

## 2020-01-03 DIAGNOSIS — Z00.00 WELLNESS EXAMINATION: Primary | ICD-10-CM

## 2020-01-03 DIAGNOSIS — Z23 NEED FOR TETANUS BOOSTER: ICD-10-CM

## 2020-01-03 DIAGNOSIS — E55.9 VITAMIN D DEFICIENCY: ICD-10-CM

## 2020-01-03 DIAGNOSIS — Z13.1 SCREENING FOR DIABETES MELLITUS (DM): ICD-10-CM

## 2020-01-03 DIAGNOSIS — Z13.220 SCREENING FOR LIPID DISORDERS: ICD-10-CM

## 2020-01-03 DIAGNOSIS — E66.09 CLASS 1 OBESITY DUE TO EXCESS CALORIES WITHOUT SERIOUS COMORBIDITY WITH BODY MASS INDEX (BMI) OF 33.0 TO 33.9 IN ADULT: ICD-10-CM

## 2020-01-03 LAB
25(OH)D3 SERPL-MCNC: 94.4 NG/ML (ref 30–100)
ALBUMIN SERPL-MCNC: 4.3 G/DL (ref 3.5–5.2)
ALBUMIN/GLOB SERPL: 1.2 G/DL
ALP SERPL-CCNC: 60 U/L (ref 39–117)
ALT SERPL W P-5'-P-CCNC: 15 U/L (ref 1–33)
ANION GAP SERPL CALCULATED.3IONS-SCNC: 11 MMOL/L (ref 5–15)
AST SERPL-CCNC: 19 U/L (ref 1–32)
BILIRUB SERPL-MCNC: 0.3 MG/DL (ref 0.2–1.2)
BUN BLD-MCNC: 17 MG/DL (ref 6–20)
BUN/CREAT SERPL: 15 (ref 7–25)
CALCIUM SPEC-SCNC: 9.8 MG/DL (ref 8.6–10.5)
CHLORIDE SERPL-SCNC: 102 MMOL/L (ref 98–107)
CHOLEST SERPL-MCNC: 213 MG/DL (ref 0–200)
CO2 SERPL-SCNC: 25 MMOL/L (ref 22–29)
CREAT BLD-MCNC: 1.13 MG/DL (ref 0.57–1)
GFR SERPL CREATININE-BSD FRML MDRD: 61 ML/MIN/1.73
GLOBULIN UR ELPH-MCNC: 3.5 GM/DL
GLUCOSE BLD-MCNC: 90 MG/DL (ref 65–99)
HDLC SERPL-MCNC: 71 MG/DL (ref 40–60)
LDLC SERPL CALC-MCNC: 126 MG/DL (ref 0–100)
LDLC/HDLC SERPL: 1.77 {RATIO}
POTASSIUM BLD-SCNC: 4.8 MMOL/L (ref 3.5–5.2)
PROT SERPL-MCNC: 7.8 G/DL (ref 6–8.5)
SODIUM BLD-SCNC: 138 MMOL/L (ref 136–145)
TRIGL SERPL-MCNC: 80 MG/DL (ref 0–150)
VLDLC SERPL-MCNC: 16 MG/DL (ref 5–40)

## 2020-01-03 PROCEDURE — 82306 VITAMIN D 25 HYDROXY: CPT

## 2020-01-03 PROCEDURE — 80053 COMPREHEN METABOLIC PANEL: CPT

## 2020-01-03 PROCEDURE — 90471 IMMUNIZATION ADMIN: CPT | Performed by: NURSE PRACTITIONER

## 2020-01-03 PROCEDURE — 99396 PREV VISIT EST AGE 40-64: CPT | Performed by: NURSE PRACTITIONER

## 2020-01-03 PROCEDURE — 90715 TDAP VACCINE 7 YRS/> IM: CPT | Performed by: NURSE PRACTITIONER

## 2020-01-03 PROCEDURE — 80061 LIPID PANEL: CPT

## 2020-01-03 RX ORDER — IBUPROFEN 800 MG/1
800 TABLET ORAL EVERY 6 HOURS PRN
Qty: 90 TABLET | Refills: 1 | Status: SHIPPED | OUTPATIENT
Start: 2020-01-03 | End: 2022-01-13 | Stop reason: SDUPTHER

## 2020-01-03 NOTE — PROGRESS NOTES
Patient Care Team:  Star Garcia APRN as PCP - General (Family Medicine)     Chief complaint: Patient is in today for a physical     Patient is a 54 y.o. female who presents for her yearly physical exam.     HPI patient today for routine yearly physical exam.  Her biggest concern is weight and she continues to gain.  She has lost some weight in the recent past down to 190s but now is back up to 205.      Review of Systems   Constitutional: Negative for appetite change, chills, fatigue and fever.   HENT: Negative for congestion, ear pain, rhinorrhea, sinus pressure and sore throat.         Has Otitis media, on Amoxicillin   Eyes: Negative for itching and visual disturbance.   Respiratory: Negative for cough, shortness of breath and wheezing.    Cardiovascular: Negative for chest pain, palpitations and leg swelling.   Gastrointestinal: Negative for abdominal pain, constipation, diarrhea, nausea and vomiting.   Endocrine: Negative for cold intolerance and heat intolerance.   Genitourinary: Negative for difficulty urinating, dysuria, hematuria and menstrual problem (LMP Dec).   Musculoskeletal: Positive for arthralgias (righjt shoulder) and back pain. Negative for joint swelling and myalgias.   Skin: Negative for rash and wound.   Allergic/Immunologic: Positive for environmental allergies. Negative for food allergies.   Neurological: Positive for numbness (legs due to back, mostly right side). Negative for dizziness and headaches.   Hematological: Negative for adenopathy. Does not bruise/bleed easily.   Psychiatric/Behavioral: Positive for sleep disturbance (right shoulder at times). Negative for dysphoric mood. The patient is not nervous/anxious.       History  Past Medical History:   Diagnosis Date   • DDD (degenerative disc disease), lumbar    • History of gallstones    • Obesity       Past Surgical History:   Procedure Laterality Date   • CHOLECYSTECTOMY     • TOE SURGERY Left     Fifth toe      Allergies    Allergen Reactions   • Mobic [Meloxicam] GI Intolerance   • Nsaids GI Intolerance      Family History   Problem Relation Age of Onset   • Lung cancer Mother    • Stomach cancer Father    • Heart disease Brother    • Breast cancer Neg Hx    • Ovarian cancer Neg Hx      Social History     Socioeconomic History   • Marital status: Single     Spouse name: Not on file   • Number of children: Not on file   • Years of education: Not on file   • Highest education level: Not on file   Tobacco Use   • Smoking status: Never Smoker   • Smokeless tobacco: Never Used   Substance and Sexual Activity   • Alcohol use: Yes     Comment: Rarely   • Drug use: No   • Sexual activity: Defer        Current Outpatient Medications:   •  estradiol (MINIVELLE, VIVELLE-DOT) 0.05 MG/24HR patch, , Disp: , Rfl:   •  fluticasone (FLONASE) 50 MCG/ACT nasal spray, 1 spray into each nostril Daily., Disp: 3 bottle, Rfl: 3  •  ibuprofen (ADVIL,MOTRIN) 800 MG tablet, Take 1 tablet by mouth Every 6 (Six) Hours As Needed for Mild Pain ., Disp: 90 tablet, Rfl: 1  •  progesterone (PROMETRIUM) 100 MG capsule, , Disp: , Rfl:   •  spironolactone (ALDACTONE) 50 MG tablet, , Disp: , Rfl:     Immunizations   N/A   Prescribed/Refused   Date     Td/Tdap  (Booster Q 10 yrs)   []           Prescribed    [x]     Refused        []           Flu  (Yearly)   [x]        Prescribed    []     Refused        []           Pneumovax  (1 yr after Prevnar)   [x]        Prescribed    []     Refused        []           Prevnar 13  (1 yr after Pneumo)   [x]        Prescribed    []     Refused        []           Hep B     [x]        Prescribed    []     Refused        []           Shingles  (Age 50 and older)   []        Prescribed    [x]     Refused        []           Immunization History   Administered Date(s) Administered   • Hepatitis A 12/31/2018, 07/05/2019   • Influenza, Unspecified 10/25/2018, 10/30/2019   • Tdap 01/01/2010, 01/03/2020     Health Maintenance   Topic Date  Due   • ZOSTER VACCINE (2 of 2) 2020   • PAP SMEAR  2020   • ANNUAL PHYSICAL  2021   • MAMMOGRAM  2021   • COLONOSCOPY  2026   • TDAP/TD VACCINES (3 - Td) 2030   • HEPATITIS C SCREENING  Completed   • INFLUENZA VACCINE  Completed        Diabetes  [] Yes  [x] No   N/A      Date     Eye Exam     []             []   Complete     []   Recommended Date:  Where:       Foot Exam     []         []   Complete          Obesity Counseling     []       []   Complete     No results found for: HGBA1C, MICROALBUR    Additional Testing      Date     Colorectal Screening       []   N/A   []   Complete    [x]   Ordered     Date:    Where:       Pap      []   N/A   []   Complete   [x]   OB/GYN Date:    Where:       Mammogram        []   N/A   [x]   Complete   []   Ordered Date:    Where:     PSA  (Over age 50)    [x]   N/A   []   Complete   []   Ordered Date:    Where:     US Aorta  (For male smokers, age 65)     [x]   N/A   []   Complete   []   Ordered Date:    Where:     CT for Smoker  (Age 55-75, 30 pk yr)    [x]   N/A   []   Complete   []   Ordered Date:    Where:     Bone Density/DEXA      [x]   N/A   []   Complete   []   Ordered Date:    Follow-up:     Hep. C  ( 6493-2135)      [x]   N/A   []   Complete   []   Ordered Date:    Where:     Results for orders placed or performed in visit on 18   Comprehensive Metabolic Panel   Result Value Ref Range    Glucose 96 70 - 100 mg/dL    BUN 14 9 - 23 mg/dL    Creatinine 1.05 0.60 - 1.30 mg/dL    Sodium 137 132 - 146 mmol/L    Potassium 4.7 3.5 - 5.5 mmol/L    Chloride 107 99 - 109 mmol/L    CO2 25.0 20.0 - 31.0 mmol/L    Calcium 9.6 8.7 - 10.4 mg/dL    Total Protein 6.7 5.7 - 8.2 g/dL    Albumin 4.39 3.20 - 4.80 g/dL    ALT (SGPT) 18 7 - 40 U/L    AST (SGOT) 21 0 - 33 U/L    Alkaline Phosphatase 60 25 - 100 U/L    Total Bilirubin 0.6 0.3 - 1.2 mg/dL    eGFR  African Amer 66 >60 mL/min/1.73    Globulin 2.3 gm/dL    A/G Ratio 1.9 1.5 - 2.5 g/dL  "   BUN/Creatinine Ratio 13.3 7.0 - 25.0    Anion Gap 5.0 3.0 - 11.0 mmol/L   Lipid Panel   Result Value Ref Range    Total Cholesterol 191 0 - 200 mg/dL    Triglycerides 75 0 - 150 mg/dL    HDL Cholesterol 66 (H) 40 - 60 mg/dL    LDL Cholesterol  117 0 - 130 mg/dL            /76   Pulse 98   Ht 167.4 cm (65.9\")   Wt 93.1 kg (205 lb 3.2 oz)   SpO2 98%   BMI 33.22 kg/m²       Physical Exam   Constitutional: She is oriented to person, place, and time. She appears well-developed and well-nourished. No distress.   HENT:   Head: Normocephalic and atraumatic.   Right Ear: External ear normal. Right ear exhibits lacerations (right canal). A middle ear effusion is present.   Left Ear: External ear normal. A middle ear effusion is present.   Nose: Nose normal.   Mouth/Throat: Oropharynx is clear and moist.   Eyes: Pupils are equal, round, and reactive to light. Conjunctivae and EOM are normal. Right eye exhibits no discharge. Left eye exhibits no discharge. No scleral icterus.   Neck: Normal range of motion. Neck supple. No JVD (no bruits) present. No tracheal deviation present. No thyromegaly present.   Cardiovascular: Normal rate, regular rhythm, normal heart sounds and intact distal pulses. Exam reveals no gallop and no friction rub.   No murmur heard.  Pulmonary/Chest: Effort normal and breath sounds normal. No respiratory distress. She has no wheezes. She has no rales. She exhibits no tenderness. Right breast exhibits no inverted nipple, no mass, no nipple discharge, no skin change and no tenderness. Left breast exhibits no inverted nipple, no mass, no nipple discharge, no skin change and no tenderness. Breasts are symmetrical.   Abdominal: Soft. Normal appearance and bowel sounds are normal. She exhibits no distension and no mass. There is no hepatosplenomegaly. There is no tenderness. No hernia.   Genitourinary:   Genitourinary Comments: Deferred to OB/GYN   Musculoskeletal: Normal range of motion. She " exhibits no edema, tenderness or deformity.   Lymphadenopathy:     She has no cervical adenopathy.   Neurological: She is alert and oriented to person, place, and time. She has normal reflexes. She displays normal reflexes.   Skin: Skin is warm and dry. No rash noted. She is not diaphoretic. No erythema. No pallor.   Psychiatric: She has a normal mood and affect. Her behavior is normal. Thought content normal.   Nursing note and vitals reviewed.          Counseling provided on weight management.    Diagnoses and all orders for this visit:    Wellness examination  -     Comprehensive Metabolic Panel; Future  -     Lipid Panel; Future  -     Vitamin D 25 Hydroxy; Future    Class 1 obesity due to excess calories without serious comorbidity with body mass index (BMI) of 33.0 to 33.9 in adult    Screening for diabetes mellitus (DM)  -     Comprehensive Metabolic Panel; Future    Screening for lipid disorders  -     Lipid Panel; Future    Vitamin D deficiency  -     Vitamin D 25 Hydroxy; Future    Need for tetanus booster  -     Tdap Vaccine Greater Than or Equal To 8yo IM    DDD (degenerative disc disease), lumbar  -     ibuprofen (ADVIL,MOTRIN) 800 MG tablet; Take 1 tablet by mouth Every 6 (Six) Hours As Needed for Mild Pain .    The wellness exam has been reviewed in detail.  The patient has been fully counseled on preventative guidelines for vaccines, cancer screenings, and other health maintenance needs.  Functional testing has been performed to assess capacity for independent living and need for other medical interventions.   The patient was counseled on maintaining a lifestyle to promote good health and to minimize chronic diseases.  The patient has been assisted with scheduling healthcare procedures for the coming year and given a written document outlining these recommendations.     Discussed need for weight management. Discussed weight loss with diet, recommend Weight Watchers or Mediterranean Diet, but stated  that whatever method works for this patient is best. Reviewed need for regular exercise.  The patient agrees with all recommendations at this time. I have discussed a weight loss plan to be determined by this patient. We have determined a weight loss goal at 6 months of    5 to 10 pounds    .  She will check with her insurance whether certain weight loss medications are covered.  Have given her a list of 3.    Will obtain routine labs today and make further recommendations pending results.     RV 1 yr or sooner      Star Garcia, APRN   1/3/2020   4:31 PM

## 2020-01-03 NOTE — PATIENT INSTRUCTIONS

## 2020-01-03 NOTE — TELEPHONE ENCOUNTER
Patient called and stated that her Tetanus shot that she recieved in clinic is not showing up on her MyChart and wants to make sure it is updated as her other shot was already listed that she had gotten today. She did attempt to leave a note for Radha on her MyChart.     Patient Callback # [phone]

## 2020-01-06 ENCOUNTER — TELEPHONE (OUTPATIENT)
Dept: FAMILY MEDICINE CLINIC | Facility: CLINIC | Age: 55
End: 2020-01-06

## 2020-01-06 NOTE — TELEPHONE ENCOUNTER
Pt called to ask about her appt tomorrow. Pt wants to know if the medication discussed is already called in, does she needs to keep appt tomorrow. Please advise      Pt call back  [phone]

## 2020-02-02 ENCOUNTER — APPOINTMENT (OUTPATIENT)
Dept: GENERAL RADIOLOGY | Facility: HOSPITAL | Age: 55
End: 2020-02-02

## 2020-02-02 ENCOUNTER — HOSPITAL ENCOUNTER (EMERGENCY)
Facility: HOSPITAL | Age: 55
Discharge: HOME | End: 2020-02-02
Attending: EMERGENCY MEDICINE | Admitting: EMERGENCY MEDICINE

## 2020-02-02 VITALS
HEART RATE: 92 BPM | SYSTOLIC BLOOD PRESSURE: 114 MMHG | WEIGHT: 196 LBS | TEMPERATURE: 97.4 F | BODY MASS INDEX: 31.5 KG/M2 | HEIGHT: 66 IN | DIASTOLIC BLOOD PRESSURE: 79 MMHG | OXYGEN SATURATION: 100 % | RESPIRATION RATE: 14 BRPM

## 2020-02-02 DIAGNOSIS — N28.9 ACUTE RENAL INSUFFICIENCY: ICD-10-CM

## 2020-02-02 DIAGNOSIS — E86.0 DEHYDRATION: Primary | ICD-10-CM

## 2020-02-02 DIAGNOSIS — R40.4 TRANSIENT ALTERATION OF AWARENESS: ICD-10-CM

## 2020-02-02 LAB
ALBUMIN SERPL-MCNC: 4.5 G/DL (ref 3.5–5.2)
ALBUMIN/GLOB SERPL: 1.4 G/DL
ALP SERPL-CCNC: 60 U/L (ref 39–117)
ALT SERPL W P-5'-P-CCNC: 12 U/L (ref 1–33)
ANION GAP SERPL CALCULATED.3IONS-SCNC: 15 MMOL/L (ref 5–15)
AST SERPL-CCNC: 20 U/L (ref 1–32)
BASOPHILS # BLD AUTO: 0.05 10*3/MM3 (ref 0–0.2)
BASOPHILS NFR BLD AUTO: 0.4 % (ref 0–1.5)
BILIRUB SERPL-MCNC: 0.6 MG/DL (ref 0.2–1.2)
BUN BLD-MCNC: 16 MG/DL (ref 6–20)
BUN/CREAT SERPL: 9.5 (ref 7–25)
CALCIUM SPEC-SCNC: 9.5 MG/DL (ref 8.6–10.5)
CHLORIDE SERPL-SCNC: 98 MMOL/L (ref 98–107)
CO2 SERPL-SCNC: 21 MMOL/L (ref 22–29)
CREAT BLD-MCNC: 1.68 MG/DL (ref 0.57–1)
DEPRECATED RDW RBC AUTO: 40.1 FL (ref 37–54)
EOSINOPHIL # BLD AUTO: 0.09 10*3/MM3 (ref 0–0.4)
EOSINOPHIL NFR BLD AUTO: 0.6 % (ref 0.3–6.2)
ERYTHROCYTE [DISTWIDTH] IN BLOOD BY AUTOMATED COUNT: 11.8 % (ref 12.3–15.4)
GFR SERPL CREATININE-BSD FRML MDRD: 38 ML/MIN/1.73
GLOBULIN UR ELPH-MCNC: 3.2 GM/DL
GLUCOSE BLD-MCNC: 154 MG/DL (ref 65–99)
GLUCOSE BLDC GLUCOMTR-MCNC: 109 MG/DL (ref 70–130)
HCT VFR BLD AUTO: 39.4 % (ref 34–46.6)
HGB BLD-MCNC: 13.6 G/DL (ref 12–15.9)
HOLD SPECIMEN: NORMAL
HOLD SPECIMEN: NORMAL
IMM GRANULOCYTES # BLD AUTO: 0.04 10*3/MM3 (ref 0–0.05)
IMM GRANULOCYTES NFR BLD AUTO: 0.3 % (ref 0–0.5)
LIPASE SERPL-CCNC: 32 U/L (ref 13–60)
LYMPHOCYTES # BLD AUTO: 5.55 10*3/MM3 (ref 0.7–3.1)
LYMPHOCYTES NFR BLD AUTO: 39.4 % (ref 19.6–45.3)
MCH RBC QN AUTO: 32.3 PG (ref 26.6–33)
MCHC RBC AUTO-ENTMCNC: 34.5 G/DL (ref 31.5–35.7)
MCV RBC AUTO: 93.6 FL (ref 79–97)
MONOCYTES # BLD AUTO: 1.05 10*3/MM3 (ref 0.1–0.9)
MONOCYTES NFR BLD AUTO: 7.5 % (ref 5–12)
NEUTROPHILS # BLD AUTO: 7.29 10*3/MM3 (ref 1.7–7)
NEUTROPHILS NFR BLD AUTO: 51.8 % (ref 42.7–76)
NRBC BLD AUTO-RTO: 0 /100 WBC (ref 0–0.2)
NT-PROBNP SERPL-MCNC: 28 PG/ML (ref 5–900)
PLATELET # BLD AUTO: 367 10*3/MM3 (ref 140–450)
PMV BLD AUTO: 10.2 FL (ref 6–12)
POTASSIUM BLD-SCNC: 4.2 MMOL/L (ref 3.5–5.2)
PROT SERPL-MCNC: 7.7 G/DL (ref 6–8.5)
RBC # BLD AUTO: 4.21 10*6/MM3 (ref 3.77–5.28)
SODIUM BLD-SCNC: 134 MMOL/L (ref 136–145)
TROPONIN T SERPL-MCNC: <0.01 NG/ML (ref 0–0.03)
WBC NRBC COR # BLD: 14.07 10*3/MM3 (ref 3.4–10.8)
WHOLE BLOOD HOLD SPECIMEN: NORMAL
WHOLE BLOOD HOLD SPECIMEN: NORMAL

## 2020-02-02 PROCEDURE — 99285 EMERGENCY DEPT VISIT HI MDM: CPT

## 2020-02-02 PROCEDURE — 85025 COMPLETE CBC W/AUTO DIFF WBC: CPT | Performed by: EMERGENCY MEDICINE

## 2020-02-02 PROCEDURE — 84484 ASSAY OF TROPONIN QUANT: CPT | Performed by: EMERGENCY MEDICINE

## 2020-02-02 PROCEDURE — 83880 ASSAY OF NATRIURETIC PEPTIDE: CPT | Performed by: EMERGENCY MEDICINE

## 2020-02-02 PROCEDURE — 93005 ELECTROCARDIOGRAM TRACING: CPT | Performed by: EMERGENCY MEDICINE

## 2020-02-02 PROCEDURE — 82962 GLUCOSE BLOOD TEST: CPT

## 2020-02-02 PROCEDURE — 71045 X-RAY EXAM CHEST 1 VIEW: CPT

## 2020-02-02 PROCEDURE — 83690 ASSAY OF LIPASE: CPT | Performed by: EMERGENCY MEDICINE

## 2020-02-02 PROCEDURE — 80053 COMPREHEN METABOLIC PANEL: CPT | Performed by: EMERGENCY MEDICINE

## 2020-02-02 RX ORDER — ASPIRIN 81 MG/1
324 TABLET, CHEWABLE ORAL ONCE
Status: COMPLETED | OUTPATIENT
Start: 2020-02-02 | End: 2020-02-02

## 2020-02-02 RX ORDER — SODIUM CHLORIDE 0.9 % (FLUSH) 0.9 %
10 SYRINGE (ML) INJECTION AS NEEDED
Status: DISCONTINUED | OUTPATIENT
Start: 2020-02-02 | End: 2020-02-02 | Stop reason: HOSPADM

## 2020-02-02 RX ADMIN — SODIUM CHLORIDE 1000 ML: 9 INJECTION, SOLUTION INTRAVENOUS at 16:21

## 2020-02-02 RX ADMIN — ASPIRIN 81 MG 324 MG: 81 TABLET ORAL at 16:14

## 2020-02-02 NOTE — ED PROVIDER NOTES
"Subjective   Tammi Richardson is a 54 year old female presenting to the ED today with complaints of heart racing. She reports today at 1330 she got the urge to sneeze and after she began experiencing heart racing that she felt up into her head. After 45 minutes of sitting down her symptoms started to improve however upon standing her symptoms returned. She states she has had four \"rounds\" of these symptoms since onset so she came to the ED. When feeling these symptoms she states she can not stand, feels very weak, and feels near syncope, however she denies any syncopal episode. She also denies fever, congestion, rhinorrhea, chest pain, shortness of breast, abdominal pain, nausea, vomiting, diarrhea, blood in her stool, or vaginal bleeding. Of note she states for the past 3 weeks she has been giving herself Saxenda injections to help with weight loss. She denies history of deep vein thrombosis or pulmonary embolism. She also denies a cardiac history. There are no other acute complaints at this time.       History provided by:  Patient  Other   Severity:  Moderate  Onset quality:  Sudden  Timing:  Intermittent  Progression:  Unchanged  Chronicity:  New  Context:  Four episodes of heart racing  Associated symptoms: no abdominal pain, no chest pain, no congestion, no cough, no diarrhea, no fever, no nausea, no rhinorrhea, no shortness of breath and no vomiting        Review of Systems   Constitutional: Negative for chills and fever.   HENT: Negative for congestion and rhinorrhea.    Respiratory: Negative for cough and shortness of breath.    Cardiovascular: Negative for chest pain.        Positive for heart racing.    Gastrointestinal: Negative for abdominal pain, blood in stool, diarrhea, nausea and vomiting.   Genitourinary: Negative for dysuria and vaginal bleeding.   Neurological: Positive for weakness. Negative for syncope.        Positive for feeling near syncope.    All other systems reviewed and are " negative.      Past Medical History:   Diagnosis Date   • DDD (degenerative disc disease), lumbar    • History of gallstones    • Obesity        Allergies   Allergen Reactions   • Mobic [Meloxicam] GI Intolerance   • Nsaids GI Intolerance       Past Surgical History:   Procedure Laterality Date   • CHOLECYSTECTOMY     • TOE SURGERY Left     Fifth toe       Family History   Problem Relation Age of Onset   • Lung cancer Mother    • Stomach cancer Father    • Heart disease Brother    • Breast cancer Neg Hx    • Ovarian cancer Neg Hx        Social History     Socioeconomic History   • Marital status: Single     Spouse name: Not on file   • Number of children: Not on file   • Years of education: Not on file   • Highest education level: Not on file   Tobacco Use   • Smoking status: Never Smoker   • Smokeless tobacco: Never Used   Substance and Sexual Activity   • Alcohol use: Yes     Comment: Rarely   • Drug use: No   • Sexual activity: Defer         Objective   Physical Exam   Constitutional: She is oriented to person, place, and time. She appears well-developed and well-nourished. No distress.   HENT:   Head: Normocephalic and atraumatic.   Eyes: Conjunctivae are normal. No scleral icterus.   Neck: Normal range of motion. Neck supple.   Cardiovascular: Regular rhythm and normal heart sounds.   No murmur heard.  Tachycardic rate.   Pulmonary/Chest: Effort normal and breath sounds normal. No respiratory distress.   Abdominal: Soft. Bowel sounds are normal. There is no tenderness.   Musculoskeletal: Normal range of motion.   Neurological: She is alert and oriented to person, place, and time.   Skin: Skin is warm and dry.   Psychiatric: She has a normal mood and affect. Her behavior is normal.   Nursing note and vitals reviewed.      Procedures         ED Course  ED Course as of Feb 02 1835   Sun Feb 02, 2020   1822 Dr. Lunsford is at bedside discussing results and updating patient on the plan.    [RP]      ED Course User  Index  [RP] Love, Kat E     Recent Results (from the past 24 hour(s))   Troponin    Collection Time: 02/02/20  3:13 PM   Result Value Ref Range    Troponin T <0.010 0.000 - 0.030 ng/mL   Comprehensive Metabolic Panel    Collection Time: 02/02/20  3:13 PM   Result Value Ref Range    Glucose 154 (H) 65 - 99 mg/dL    BUN 16 6 - 20 mg/dL    Creatinine 1.68 (H) 0.57 - 1.00 mg/dL    Sodium 134 (L) 136 - 145 mmol/L    Potassium 4.2 3.5 - 5.2 mmol/L    Chloride 98 98 - 107 mmol/L    CO2 21.0 (L) 22.0 - 29.0 mmol/L    Calcium 9.5 8.6 - 10.5 mg/dL    Total Protein 7.7 6.0 - 8.5 g/dL    Albumin 4.50 3.50 - 5.20 g/dL    ALT (SGPT) 12 1 - 33 U/L    AST (SGOT) 20 1 - 32 U/L    Alkaline Phosphatase 60 39 - 117 U/L    Total Bilirubin 0.6 0.2 - 1.2 mg/dL    eGFR  African Amer 38 (L) >60 mL/min/1.73    Globulin 3.2 gm/dL    A/G Ratio 1.4 g/dL    BUN/Creatinine Ratio 9.5 7.0 - 25.0    Anion Gap 15.0 5.0 - 15.0 mmol/L   Lipase    Collection Time: 02/02/20  3:13 PM   Result Value Ref Range    Lipase 32 13 - 60 U/L   BNP    Collection Time: 02/02/20  3:13 PM   Result Value Ref Range    proBNP 28.0 5.0 - 900.0 pg/mL   Light Blue Top    Collection Time: 02/02/20  3:13 PM   Result Value Ref Range    Extra Tube hold for add-on    Green Top (Gel)    Collection Time: 02/02/20  3:13 PM   Result Value Ref Range    Extra Tube Hold for add-ons.    Lavender Top    Collection Time: 02/02/20  3:13 PM   Result Value Ref Range    Extra Tube hold for add-on    Gold Top - SST    Collection Time: 02/02/20  3:13 PM   Result Value Ref Range    Extra Tube Hold for add-ons.    CBC Auto Differential    Collection Time: 02/02/20  3:13 PM   Result Value Ref Range    WBC 14.07 (H) 3.40 - 10.80 10*3/mm3    RBC 4.21 3.77 - 5.28 10*6/mm3    Hemoglobin 13.6 12.0 - 15.9 g/dL    Hematocrit 39.4 34.0 - 46.6 %    MCV 93.6 79.0 - 97.0 fL    MCH 32.3 26.6 - 33.0 pg    MCHC 34.5 31.5 - 35.7 g/dL    RDW 11.8 (L) 12.3 - 15.4 %    RDW-SD 40.1 37.0 - 54.0 fl    MPV 10.2 6.0  - 12.0 fL    Platelets 367 140 - 450 10*3/mm3    Neutrophil % 51.8 42.7 - 76.0 %    Lymphocyte % 39.4 19.6 - 45.3 %    Monocyte % 7.5 5.0 - 12.0 %    Eosinophil % 0.6 0.3 - 6.2 %    Basophil % 0.4 0.0 - 1.5 %    Immature Grans % 0.3 0.0 - 0.5 %    Neutrophils, Absolute 7.29 (H) 1.70 - 7.00 10*3/mm3    Lymphocytes, Absolute 5.55 (H) 0.70 - 3.10 10*3/mm3    Monocytes, Absolute 1.05 (H) 0.10 - 0.90 10*3/mm3    Eosinophils, Absolute 0.09 0.00 - 0.40 10*3/mm3    Basophils, Absolute 0.05 0.00 - 0.20 10*3/mm3    Immature Grans, Absolute 0.04 0.00 - 0.05 10*3/mm3    nRBC 0.0 0.0 - 0.2 /100 WBC   POC Glucose Once    Collection Time: 02/02/20  3:18 PM   Result Value Ref Range    Glucose 109 70 - 130 mg/dL     Note: In addition to lab results from this visit, the labs listed above may include labs taken at another facility or during a different encounter within the last 24 hours. Please correlate lab times with ED admission and discharge times for further clarification of the services performed during this visit.    XR Chest 1 View   Preliminary Result   No acute cardiopulmonary process.                Vitals:    02/02/20 1615 02/02/20 1617 02/02/20 1619 02/02/20 1738   BP: (S) 117/76 (S) 116/74 (S) 116/81 111/72   BP Location:       Patient Position: (S) Lying (S) Sitting (S) Standing    Pulse: (S) 99 (S) 101 (S) 101 96   Resp:       Temp:       TempSrc:       SpO2:    93%   Weight:       Height:         Medications   sodium chloride 0.9 % flush 10 mL (has no administration in time range)   aspirin chewable tablet 324 mg (324 mg Oral Given 2/2/20 1614)   sodium chloride 0.9 % bolus 1,000 mL (0 mL Intravenous Stopped 2/2/20 1750)     ECG/EMG Results (last 24 hours)     Procedure Component Value Units Date/Time    ECG 12 Lead [[id number]] Collected:  02/02/20 1512     Updated:  02/02/20 1511        ECG 12 Lead                                                        MDM  Number of Diagnoses or Management Options  Acute renal  insufficiency: new and requires workup  Dehydration: new and requires workup  Transient alteration of awareness: new and requires workup  Diagnosis management comments: Per initial nursing report the patient was somnolent clammy and had decreased responsiveness.      Upon my evaluation, which was within minutes of presentation she was awake and alert and able to talk and answer all questions appropriately.  She did not appear diaphoretic and she was only tachycardic to approximately 100-105.    The patient is afebrile denies any fever.    She does admit that she has had a dry mouth and over the last 3 weeks she has taken the new medication for weight loss.    Laboratory evaluation shows concern for dehydration with acute renal insufficiency.  Creatinine was elevated to 1.68.  IV fluids were administered here the patient was monitored and vital signs have remained stable.    The patient will be advised to drink plenty of water and will be advised to follow-up with primary care physician for recheck within the next week.       Amount and/or Complexity of Data Reviewed  Clinical lab tests: ordered and reviewed  Decide to obtain previous medical records or to obtain history from someone other than the patient: yes  Review and summarize past medical records: yes  Independent visualization of images, tracings, or specimens: yes        Final diagnoses:   Dehydration   Transient alteration of awareness   Acute renal insufficiency       Documentation assistance provided by itzel Love.  Information recorded by the scribe was done at my direction and has been verified and validated by me.     Kat Love  02/02/20 4071       Facundo Lunsford MD  02/02/20 [id number]

## 2020-02-02 NOTE — DISCHARGE INSTRUCTIONS
Return to the ED with any new or worsening symptoms.    Is to drink plenty of fluids.    Follow-up with primary care physician for repeat evaluation within the next week.

## 2020-02-21 ENCOUNTER — APPOINTMENT (OUTPATIENT)
Dept: LAB | Facility: HOSPITAL | Age: 55
End: 2020-02-21

## 2020-02-21 ENCOUNTER — OFFICE VISIT (OUTPATIENT)
Dept: FAMILY MEDICINE CLINIC | Facility: CLINIC | Age: 55
End: 2020-02-21

## 2020-02-21 VITALS
DIASTOLIC BLOOD PRESSURE: 78 MMHG | HEART RATE: 89 BPM | OXYGEN SATURATION: 99 % | SYSTOLIC BLOOD PRESSURE: 114 MMHG | HEIGHT: 66 IN | BODY MASS INDEX: 32.24 KG/M2 | WEIGHT: 200.6 LBS

## 2020-02-21 DIAGNOSIS — E66.09 CLASS 1 OBESITY DUE TO EXCESS CALORIES WITHOUT SERIOUS COMORBIDITY WITH BODY MASS INDEX (BMI) OF 33.0 TO 33.9 IN ADULT: Primary | ICD-10-CM

## 2020-02-21 DIAGNOSIS — N17.9 ACUTE RENAL FAILURE, UNSPECIFIED ACUTE RENAL FAILURE TYPE (HCC): ICD-10-CM

## 2020-02-21 DIAGNOSIS — H69.83 EUSTACHIAN TUBE DYSFUNCTION, BILATERAL: ICD-10-CM

## 2020-02-21 LAB
ANION GAP SERPL CALCULATED.3IONS-SCNC: 10.7 MMOL/L (ref 5–15)
BUN BLD-MCNC: 17 MG/DL (ref 6–20)
BUN/CREAT SERPL: 15.5 (ref 7–25)
CALCIUM SPEC-SCNC: 9.6 MG/DL (ref 8.6–10.5)
CHLORIDE SERPL-SCNC: 100 MMOL/L (ref 98–107)
CO2 SERPL-SCNC: 24.3 MMOL/L (ref 22–29)
CREAT BLD-MCNC: 1.1 MG/DL (ref 0.57–1)
GFR SERPL CREATININE-BSD FRML MDRD: 63 ML/MIN/1.73
GLUCOSE BLD-MCNC: 100 MG/DL (ref 65–99)
POTASSIUM BLD-SCNC: 4.5 MMOL/L (ref 3.5–5.2)
SODIUM BLD-SCNC: 135 MMOL/L (ref 136–145)

## 2020-02-21 PROCEDURE — 99213 OFFICE O/P EST LOW 20 MIN: CPT | Performed by: NURSE PRACTITIONER

## 2020-02-21 PROCEDURE — 80048 BASIC METABOLIC PNL TOTAL CA: CPT | Performed by: NURSE PRACTITIONER

## 2020-02-21 RX ORDER — FLUTICASONE PROPIONATE 50 MCG
1 SPRAY, SUSPENSION (ML) NASAL DAILY
Qty: 3 BOTTLE | Refills: 3 | Status: SHIPPED | OUTPATIENT
Start: 2020-02-21 | End: 2021-12-03

## 2020-02-21 NOTE — PATIENT INSTRUCTIONS
Obesity, Adult  Obesity is the condition of having too much total body fat. Being overweight or obese means that your weight is greater than what is considered healthy for your body size. Obesity is determined by a measurement called BMI. BMI is an estimate of body fat and is calculated from height and weight. For adults, a BMI of 30 or higher is considered obese.  Obesity can lead to other health concerns and major illnesses, including:  · Stroke.  · Coronary artery disease (CAD).  · Type 2 diabetes.  · Some types of cancer, including cancers of the colon, breast, uterus, and gallbladder.  · Osteoarthritis.  · High blood pressure (hypertension).  · High cholesterol.  · Sleep apnea.  · Gallbladder stones.  · Infertility problems.  What are the causes?  Common causes of this condition include:  · Eating daily meals that are high in calories, sugar, and fat.  · Being born with genes that may make you more likely to become obese.  · Having a medical condition that causes obesity, including:  ? Hypothyroidism.  ? Polycystic ovarian syndrome (PCOS).  ? Binge-eating disorder.  ? Cushing syndrome.  · Taking certain medicines, such as steroids, antidepressants, and seizure medicines.  · Not being physically active (sedentary lifestyle).  · Not getting enough sleep.  · Drinking high amounts of sugar-sweetened beverages, such as soft drinks.  What increases the risk?  The following factors may make you more likely to develop this condition:  · Having a family history of obesity.  · Being a woman of  descent.  · Being a man of  descent.  · Living in an area with limited access to:  ? [place], recreation centers, or sidewalks.  ? Healthy food choices, such as grocery stores and farmers' markets.  What are the signs or symptoms?  The main sign of this condition is having too much body fat.  How is this diagnosed?  This condition is diagnosed based on:  · Your BMI. If you are an adult with a BMI of 30 or  higher, you are considered obese.  · Your waist circumference. This measures the distance around your waistline.  · Your skinfold thickness. Your health care provider may gently pinch a fold of your skin and measure it.  You may have other tests to check for underlying conditions.  How is this treated?  Treatment for this condition often includes changing your lifestyle. Treatment may include some or all of the following:  · Dietary changes. This may include developing a healthy meal plan.  · Regular physical activity. This may include activity that causes your heart to beat faster (aerobic exercise) and strength training. Work with your health care provider to design an exercise program that works for you.  · Medicine to help you lose weight if you are unable to lose 1 pound a week after 6 weeks of healthy eating and more physical activity.  · Treating conditions that cause the obesity (underlying conditions).  · Surgery. Surgical options may include gastric banding and gastric bypass. Surgery may be done if:  ? Other treatments have not helped to improve your condition.  ? You have a BMI of 40 or higher.  ? You have life-threatening health problems related to obesity.  Follow these instructions at home:  Eating and drinking    · Follow recommendations from your health care provider about what you eat and drink. Your health care provider may advise you to:  ? Limit fast food, sweets, and processed snack foods.  ? Choose low-fat options, such as low-fat milk instead of whole milk.  ? Eat 5 or more servings of fruits or vegetables every day.  ? Eat at home more often. This gives you more control over what you eat.  ? Choose healthy foods when you eat out.  ? Learn to read food labels. This will help you understand how much food is considered 1 serving.  ? Learn what a healthy serving size is.  ? Keep low-fat snacks available.  ? Limit sugary drinks, such as soda, fruit juice, sweetened iced tea, and flavored  milk.  · Drink enough water to keep your urine pale yellow.  · Do not follow a fad diet. Fad diets can be unhealthy and even dangerous.  Physical activity  · Exercise regularly, as told by your health care provider.  ? Most adults should get up to 150 minutes of moderate-intensity exercise every week.  ? Ask your health care provider what types of exercise are safe for you and how often you should exercise.  · Warm up and stretch before being active.  · Cool down and stretch after being active.  · Rest between periods of activity.  Lifestyle  · Work with your health care provider and a dietitian to set a weight-loss goal that is healthy and reasonable for you.  · Limit your screen time.  · Find ways to reward yourself that do not involve food.  · Do not drink alcohol if:  ? Your health care provider tells you not to drink.  ? You are pregnant, may be pregnant, or are planning to become pregnant.  · If you drink alcohol:  ? Limit how much you use to:  § 0-1 drink a day for women.  § 0-2 drinks a day for men.  ? Be aware of how much alcohol is in your drink. In the [place] one drink equals one 12 oz bottle of beer (355 mL), one 5 oz glass of wine (148 mL), or one 1½ oz glass of hard liquor (44 mL).  General instructions  · Keep a weight-loss journal to keep track of the food you eat and how much exercise you get.  · Take over-the-counter and prescription medicines only as told by your health care provider.  · Take vitamins and supplements only as told by your health care provider.  · Consider joining a support group. Your health care provider may be able to recommend a support group.  · Keep all follow-up visits as told by your health care provider. This is important.  Contact a health care provider if:  · You are unable to meet your weight loss goal after 6 weeks of dietary and lifestyle changes.  Get help right away if you are having:  · Trouble breathing.  · Suicidal thoughts or behaviors.  Summary  · Obesity is the  condition of having too much total body fat.  · Being overweight or obese means that your weight is greater than what is considered healthy for your body size.  · Work with your health care provider and a dietitian to set a weight-loss goal that is healthy and reasonable for you.  · Exercise regularly, as told by your health care provider. Ask your health care provider what types of exercise are safe for you and how often you should exercise.  This information is not intended to replace advice given to you by your health care provider. Make sure you discuss any questions you have with your health care provider.  Document Released: 01/25/2006 Document Revised: 08/22/2019 Document Reviewed: 08/22/2019  [employer] Interactive Patient Education © 2020 [employer]    Liraglutide injection (Weight Management)  What is this medicine?  LIRAGLUTIDE (LIR a GLOO tide) is used to help people lose weight and maintain weight loss. It is used with a reduced-calorie diet and exercise.  This medicine may be used for other purposes; ask your health care provider or pharmacist if you have questions.  COMMON BRAND NAME(S): Fortunato  What should I tell my health care provider before I take this medicine?  They need to know if you have any of these conditions:  -endocrine tumors (MEN 2) or if someone in your family had these tumors  -gallbladder disease  -high cholesterol  -history of alcohol abuse problem  -history of pancreatitis  -kidney disease or if you are on dialysis  -liver disease  -previous swelling of the tongue, face, or lips with difficulty breathing, difficulty swallowing, hoarseness, or tightening of the throat  -stomach problems  -suicidal thoughts, plans, or attempt; a previous suicide attempt by you or a family member  -thyroid cancer or if someone in your family had thyroid cancer  -an unusual or allergic reaction to liraglutide, other medicines, foods, dyes, or preservatives  -pregnant or trying to get  pregnant  -breast-feeding  How should I use this medicine?  This medicine is for injection under the skin of your upper leg, stomach area, or upper arm. You will be taught how to prepare and give this medicine. Use exactly as directed. Take your medicine at regular intervals. Do not take it more often than directed.  It is important that you put your used needles and syringes in a special sharps container. Do not put them in a trash can. If you do not have a sharps container, call your pharmacist or healthcare provider to get one.  A special MedGuide will be given to you by the pharmacist with each prescription and refill. Be sure to read this information carefully each time.  Talk to your pediatrician regarding the use of this medicine in children. Special care may be needed.  Overdosage: If you think you have taken too much of this medicine contact a poison control center or emergency room at once.  NOTE: This medicine is only for you. Do not share this medicine with others.  What if I miss a dose?  If you miss a dose, take it as soon as you can. If it is almost time for your next dose, take only that dose. Do not take double or extra doses. If you miss your dose for 3 days or more, call your doctor or health care professional to talk about how to restart this medicine.  What may interact with this medicine?  -insulin and other medicines for diabetes  This list may not describe all possible interactions. Give your health care provider a list of all the medicines, herbs, non-prescription drugs, or dietary supplements you use. Also tell them if you smoke, drink alcohol, or use illegal drugs. Some items may interact with your medicine.  What should I watch for while using this medicine?  Visit your doctor or health care professional for regular checks on your progress. This medicine is intended to be used in addition to a healthy diet and appropriate exercise. The best results are achieved this way. Do not increase  or in any way change your dose without consulting your doctor or health care professional.  Drink plenty of fluids while taking this medicine. Check with your doctor or health care professional if you get an attack of severe diarrhea, nausea, and vomiting. The loss of too much body fluid can make it dangerous for you to take this medicine.  This medicine may affect blood sugar levels. If you have diabetes, check with your doctor or health care professional before you change your diet or the dose of your diabetic medicine.  Patients and their families should watch out for worsening depression or thoughts of suicide. Also watch out for sudden changes in feelings such as feeling anxious, agitated, panicky, irritable, hostile, aggressive, impulsive, severely restless, overly excited and hyperactive, or not being able to sleep. If this happens, especially at the beginning of treatment or after a change in dose, call your health care professional.  What side effects may I notice from receiving this medicine?  Side effects that you should report to your doctor or health care professional as soon as possible:  -allergic reactions like skin rash, itching or hives, swelling of the face, lips, or tongue  -breathing problems  -diarrhea that continues or is severe  -lump or swelling on the neck  -severe nausea  -signs and symptoms of infection like fever or chills; cough; sore throat; pain or trouble passing urine  -signs and symptoms of low blood sugar such as feeling anxious, confusion, dizziness, increased hunger, unusually weak or tired, sweating, shakiness, cold, irritable, headache, blurred vision, fast heartbeat, loss of consciousness  -signs and symptoms of kidney injury like trouble passing urine or change in the amount of urine  -trouble swallowing  -unusual stomach upset or pain  -vomiting  Side effects that usually do not require medical attention (report to your doctor or health care professional if they continue or  are bothersome):  -constipation  -decreased appetite  -diarrhea  -fatigue  -headache  -nausea  -pain, redness, or irritation at site where injected  -stomach upset  -stuffy or runny nose  This list may not describe all possible side effects. Call your doctor for medical advice about side effects. You may report side effects to FDA at 5-854-FDA-3573.  Where should I keep my medicine?  Keep out of the reach of children.  Store unopened pen in a refrigerator between 2 and 8 degrees C (36 and 46 degrees F). Do not freeze or use if the medicine has been frozen. Protect from light and excessive heat. After you first use the pen, it can be stored at room temperature between 15 and 30 degrees C (59 and 86 degrees F) or in a refrigerator. Throw away your used pen after 30 days or after the expiration date, whichever comes first.  Do not store your pen with the needle attached. If the needle is left on, medicine may leak from the pen.  NOTE: This sheet is a summary. It may not cover all possible information. If you have questions about this medicine, talk to your doctor, pharmacist, or health care provider.  © 2020 [employer] (2020-01-24 17:10:35)

## 2020-02-21 NOTE — PROGRESS NOTES
Chief Complaint   Patient presents with   • Follow-up       Subjective   Tammi Richardson is a 54 y.o. female    History of Present Illness  Patient in for weight follow up, Started on Saxenda on 1/23/20, is now up to 3.0 dose. Tolerating well. She did go to ER on 2/2/20 with dehydration.  Her glucose was 154, creatinine 1.68, sodium 134, calcium 4.2, CO2 21 and EGFR was 38.  WBC at that time was 14.07, and RDW was 11.8.  Everything else was in normal range.  She does use flonase for allergic rhinitis, needs refill.     Allergies   Allergen Reactions   • Mobic [Meloxicam] GI Intolerance   • Nsaids GI Intolerance     Past Medical History:   Diagnosis Date   • DDD (degenerative disc disease), lumbar    • History of gallstones    • Obesity       Past Surgical History:   Procedure Laterality Date   • CHOLECYSTECTOMY     • TOE SURGERY Left     Fifth toe     Social History     Socioeconomic History   • Marital status: Single     Spouse name: Not on file   • Number of children: Not on file   • Years of education: Not on file   • Highest education level: Not on file   Tobacco Use   • Smoking status: Never Smoker   • Smokeless tobacco: Never Used   Substance and Sexual Activity   • Alcohol use: Yes     Comment: Rarely   • Drug use: No   • Sexual activity: Defer        Current Outpatient Medications:   •  estradiol (MINIVELLE, VIVELLE-DOT) 0.05 MG/24HR patch, , Disp: , Rfl:   •  fluticasone (FLONASE) 50 MCG/ACT nasal spray, 1 spray into the nostril(s) as directed by provider Daily., Disp: 3 bottle, Rfl: 3  •  ibuprofen (ADVIL,MOTRIN) 800 MG tablet, Take 1 tablet by mouth Every 6 (Six) Hours As Needed for Mild Pain ., Disp: 90 tablet, Rfl: 1  •  Liraglutide -Weight Management (SAXENDA) 18 MG/3ML solution pen-injector, Start 0.6 mg QDX1 wk, then 1.2 mg QD X1wk, then 1.8 mg QD for 1 wk, then 2.4 mg QD for 1 wk, then 3 mg QD thereafter, Disp: 5 pen, Rfl: 5  •  progesterone (PROMETRIUM) 100 MG capsule, , Disp: , Rfl:   •   spironolactone (ALDACTONE) 50 MG tablet, , Disp: , Rfl:      Review of Systems   Constitutional: Negative for chills, fatigue and unexpected weight change.   Respiratory: Negative for cough, chest tightness, shortness of breath and wheezing.    Cardiovascular: Negative for chest pain, palpitations and leg swelling.   Gastrointestinal: Negative for constipation, diarrhea, nausea and vomiting.   Genitourinary: Negative for difficulty urinating and dysuria.   Musculoskeletal: Positive for back pain.   Skin: Negative for color change and rash.   Neurological: Negative for dizziness, syncope, weakness and headaches.   Psychiatric/Behavioral: Negative for sleep disturbance.       Objective     Vitals:    02/21/20 0749   BP: 114/78   Pulse: 89   SpO2: 99%       Results for orders placed or performed during the hospital encounter of 02/02/20   Troponin   Result Value Ref Range    Troponin T <0.010 0.000 - 0.030 ng/mL   Comprehensive Metabolic Panel   Result Value Ref Range    Glucose 154 (H) 65 - 99 mg/dL    BUN 16 6 - 20 mg/dL    Creatinine 1.68 (H) 0.57 - 1.00 mg/dL    Sodium 134 (L) 136 - 145 mmol/L    Potassium 4.2 3.5 - 5.2 mmol/L    Chloride 98 98 - 107 mmol/L    CO2 21.0 (L) 22.0 - 29.0 mmol/L    Calcium 9.5 8.6 - 10.5 mg/dL    Total Protein 7.7 6.0 - 8.5 g/dL    Albumin 4.50 3.50 - 5.20 g/dL    ALT (SGPT) 12 1 - 33 U/L    AST (SGOT) 20 1 - 32 U/L    Alkaline Phosphatase 60 39 - 117 U/L    Total Bilirubin 0.6 0.2 - 1.2 mg/dL    eGFR  African Amer 38 (L) >60 mL/min/1.73    Globulin 3.2 gm/dL    A/G Ratio 1.4 g/dL    BUN/Creatinine Ratio 9.5 7.0 - 25.0    Anion Gap 15.0 5.0 - 15.0 mmol/L   Lipase   Result Value Ref Range    Lipase 32 13 - 60 U/L   BNP   Result Value Ref Range    proBNP 28.0 5.0 - 900.0 pg/mL   CBC Auto Differential   Result Value Ref Range    WBC 14.07 (H) 3.40 - 10.80 10*3/mm3    RBC 4.21 3.77 - 5.28 10*6/mm3    Hemoglobin 13.6 12.0 - 15.9 g/dL    Hematocrit 39.4 34.0 - 46.6 %    MCV 93.6 79.0 - 97.0  fL    MCH 32.3 26.6 - 33.0 pg    MCHC 34.5 31.5 - 35.7 g/dL    RDW 11.8 (L) 12.3 - 15.4 %    RDW-SD 40.1 37.0 - 54.0 fl    MPV 10.2 6.0 - 12.0 fL    Platelets 367 140 - 450 10*3/mm3    Neutrophil % 51.8 42.7 - 76.0 %    Lymphocyte % 39.4 19.6 - 45.3 %    Monocyte % 7.5 5.0 - 12.0 %    Eosinophil % 0.6 0.3 - 6.2 %    Basophil % 0.4 0.0 - 1.5 %    Immature Grans % 0.3 0.0 - 0.5 %    Neutrophils, Absolute 7.29 (H) 1.70 - 7.00 10*3/mm3    Lymphocytes, Absolute 5.55 (H) 0.70 - 3.10 10*3/mm3    Monocytes, Absolute 1.05 (H) 0.10 - 0.90 10*3/mm3    Eosinophils, Absolute 0.09 0.00 - 0.40 10*3/mm3    Basophils, Absolute 0.05 0.00 - 0.20 10*3/mm3    Immature Grans, Absolute 0.04 0.00 - 0.05 10*3/mm3    nRBC 0.0 0.0 - 0.2 /100 WBC   POC Glucose Once   Result Value Ref Range    Glucose 109 70 - 130 mg/dL   Light Blue Top   Result Value Ref Range    Extra Tube hold for add-on    Green Top (Gel)   Result Value Ref Range    Extra Tube Hold for add-ons.    Lavender Top   Result Value Ref Range    Extra Tube hold for add-on    Gold Top - SST   Result Value Ref Range    Extra Tube Hold for add-ons.        Physical Exam   Constitutional: She is oriented to person, place, and time. She appears well-developed and well-nourished.   HENT:   Head: Normocephalic and atraumatic.   Cardiovascular: Normal rate, regular rhythm and normal heart sounds.   Pulmonary/Chest: Effort normal and breath sounds normal.   Musculoskeletal: She exhibits no edema.   Neurological: She is alert and oriented to person, place, and time.   Skin: Skin is warm and dry.   Psychiatric: She has a normal mood and affect. Her behavior is normal.   Vitals reviewed.      Assessment/Plan   Problem List Items Addressed This Visit        Digestive    Class 1 obesity due to excess calories without serious comorbidity with body mass index (BMI) of 33.0 to 33.9 in adult - Primary       Nervous and Auditory    Eustachian tube dysfunction, bilateral    Relevant Medications     fluticasone (FLONASE) 50 MCG/ACT nasal spray      Other Visit Diagnoses     Acute renal failure, unspecified acute renal failure type (CMS/HCC)        Relevant Orders    Basic Metabolic Panel        We will continue her on Saxenda at this time.  I am going to check a basic metabolic panel as she had the acute renal failure February 2.  This patient was given a weight loss medication today after reviewing the most commonly prescribed weight loss medications and the pro and con of each. Patient instructed of the common side effects and reasons to stop this medication and report to me any side effects causing them do stop this medication.     RV- 3 mo    Counseling provided on weight management.    Star Garcia, APRN   2/21/2020   8:14 AM

## 2020-03-06 ENCOUNTER — HOSPITAL ENCOUNTER (OUTPATIENT)
Dept: MAMMOGRAPHY | Facility: HOSPITAL | Age: 55
Discharge: HOME | End: 2020-03-06
Admitting: NURSE PRACTITIONER

## 2020-03-06 DIAGNOSIS — Z12.31 VISIT FOR SCREENING MAMMOGRAM: ICD-10-CM

## 2020-03-06 PROCEDURE — 77067 SCR MAMMO BI INCL CAD: CPT | Performed by: RADIOLOGY

## 2020-03-06 PROCEDURE — 77063 BREAST TOMOSYNTHESIS BI: CPT

## 2020-03-06 PROCEDURE — 77063 BREAST TOMOSYNTHESIS BI: CPT | Performed by: RADIOLOGY

## 2020-03-06 PROCEDURE — 77067 SCR MAMMO BI INCL CAD: CPT

## 2020-05-22 ENCOUNTER — OFFICE VISIT (OUTPATIENT)
Dept: FAMILY MEDICINE CLINIC | Facility: CLINIC | Age: 55
End: 2020-05-22

## 2020-05-22 VITALS
BODY MASS INDEX: 30.86 KG/M2 | SYSTOLIC BLOOD PRESSURE: 128 MMHG | HEIGHT: 66 IN | WEIGHT: 192 LBS | DIASTOLIC BLOOD PRESSURE: 78 MMHG

## 2020-05-22 DIAGNOSIS — E66.09 CLASS 1 OBESITY DUE TO EXCESS CALORIES WITHOUT SERIOUS COMORBIDITY WITH BODY MASS INDEX (BMI) OF 33.0 TO 33.9 IN ADULT: Primary | ICD-10-CM

## 2020-05-22 DIAGNOSIS — B37.31 VAGINAL YEAST INFECTION: ICD-10-CM

## 2020-05-22 PROCEDURE — 99213 OFFICE O/P EST LOW 20 MIN: CPT | Performed by: NURSE PRACTITIONER

## 2020-05-22 RX ORDER — FLUCONAZOLE 150 MG/1
150 TABLET ORAL AS NEEDED
Qty: 2 TABLET | Refills: 0 | Status: SHIPPED | OUTPATIENT
Start: 2020-05-22 | End: 2020-08-25

## 2020-05-22 NOTE — PROGRESS NOTES
Chief Complaint   Patient presents with   • Follow-up   • Class 1 obesity due to excess calories without serious comor       Subjective   Tammi Richardson is a 54 y.o. female    History of Present Illness  Patient today for follow-up for weight.  She has been using Saxenda since the end of January and reports some where between an 8 to 13 pound weight loss.  She denies any significant side effects from Saxenda at this time.  Currently her blood pressure is well.    Allergies   Allergen Reactions   • Mobic [Meloxicam] GI Intolerance   • Nsaids GI Intolerance     Past Medical History:   Diagnosis Date   • DDD (degenerative disc disease), lumbar    • History of gallstones    • Obesity       Past Surgical History:   Procedure Laterality Date   • CHOLECYSTECTOMY     • TOE SURGERY Left     Fifth toe     Social History     Socioeconomic History   • Marital status: Single     Spouse name: Not on file   • Number of children: Not on file   • Years of education: Not on file   • Highest education level: Not on file   Tobacco Use   • Smoking status: Never Smoker   • Smokeless tobacco: Never Used   Substance and Sexual Activity   • Alcohol use: Yes     Comment: Rarely   • Drug use: No   • Sexual activity: Defer        Current Outpatient Medications:   •  estradiol (MINIVELLE, VIVELLE-DOT) 0.05 MG/24HR patch, , Disp: , Rfl:   •  fluticasone (FLONASE) 50 MCG/ACT nasal spray, 1 spray into the nostril(s) as directed by provider Daily., Disp: 3 bottle, Rfl: 3  •  ibuprofen (ADVIL,MOTRIN) 800 MG tablet, Take 1 tablet by mouth Every 6 (Six) Hours As Needed for Mild Pain ., Disp: 90 tablet, Rfl: 1  •  Liraglutide -Weight Management (Saxenda) 18 MG/3ML solution pen-injector, Start 0.6 mg QDX1 wk, then 1.2 mg QD X1wk, then 1.8 mg QD for 1 wk, then 2.4 mg QD for 1 wk, then 3 mg QD thereafter, Disp: 5 pen, Rfl: 5  •  progesterone (PROMETRIUM) 100 MG capsule, , Disp: , Rfl:   •  spironolactone (ALDACTONE) 50 MG tablet, , Disp: , Rfl:   •   fluconazole (Diflucan) 150 MG tablet, Take 1 tablet by mouth As Needed (may take now and repeat in 3 days PRN)., Disp: 2 tablet, Rfl: 0     Review of Systems   Constitutional: Negative for chills, fatigue and unexpected weight change.   Respiratory: Negative for cough, chest tightness, shortness of breath and wheezing.    Cardiovascular: Negative for chest pain, palpitations and leg swelling.   Gastrointestinal: Negative for constipation, diarrhea, nausea and vomiting.   Genitourinary: Negative for difficulty urinating and dysuria.        Vaginal yeast infection   Skin: Negative for color change and rash.   Neurological: Negative for dizziness, syncope, weakness and headaches.   Psychiatric/Behavioral: Negative for sleep disturbance.       Objective     Vitals:    05/22/20 0806   BP: 128/78       Results for orders placed or performed in visit on 02/21/20   Basic Metabolic Panel   Result Value Ref Range    Glucose 100 (H) 65 - 99 mg/dL    BUN 17 6 - 20 mg/dL    Creatinine 1.10 (H) 0.57 - 1.00 mg/dL    Sodium 135 (L) 136 - 145 mmol/L    Potassium 4.5 3.5 - 5.2 mmol/L    Chloride 100 98 - 107 mmol/L    CO2 24.3 22.0 - 29.0 mmol/L    Calcium 9.6 8.6 - 10.5 mg/dL    eGFR  African Amer 63 >60 mL/min/1.73    BUN/Creatinine Ratio 15.5 7.0 - 25.0    Anion Gap 10.7 5.0 - 15.0 mmol/L       Physical Exam   Constitutional: She is oriented to person, place, and time. She appears well-developed and well-nourished.   HENT:   Head: Normocephalic and atraumatic.   Cardiovascular: Normal rate, regular rhythm and normal heart sounds.   Pulmonary/Chest: Effort normal and breath sounds normal.   Musculoskeletal: She exhibits no edema.   Neurological: She is alert and oriented to person, place, and time.   Skin: Skin is warm and dry.   Psychiatric: She has a normal mood and affect. Her behavior is normal.   Vitals reviewed.      Assessment/Plan   Problem List Items Addressed This Visit        Digestive    Class 1 obesity due to excess  calories without serious comorbidity with body mass index (BMI) of 33.0 to 33.9 in adult - Primary    Relevant Medications    Liraglutide -Weight Management (Saxenda) 18 MG/3ML solution pen-injector      Other Visit Diagnoses     Vaginal yeast infection        Relevant Medications    fluconazole (Diflucan) 150 MG tablet      This patient was given a weight loss medication today after reviewing the most commonly prescribed weight loss medications and the pro and con of each. Patient instructed of the common side effects and reasons to stop this medication and report to me any side effects causing them do stop this medication.  Saxenda. Patient was encouraged to keep me informed of any acute changes, lack of improvement, or any new concerning symptoms.  If patient becomes concerned, or has any worsening symptoms, they are to report either here, urgent treatment, or emergency room. Plan of care discussed with pt. They verbalized understanding and agreement.     RV- 3 mo    Counseling provided on weight management.    Star Garcia, APRN   5/22/2020   08:27

## 2020-05-22 NOTE — PATIENT INSTRUCTIONS
Obesity, Adult  Obesity is the condition of having too much total body fat. Being overweight or obese means that your weight is greater than what is considered healthy for your body size. Obesity is determined by a measurement called BMI. BMI is an estimate of body fat and is calculated from height and weight. For adults, a BMI of 30 or higher is considered obese.  Obesity can lead to other health concerns and major illnesses, including:  · Stroke.  · Coronary artery disease (CAD).  · Type 2 diabetes.  · Some types of cancer, including cancers of the colon, breast, uterus, and gallbladder.  · Osteoarthritis.  · High blood pressure (hypertension).  · High cholesterol.  · Sleep apnea.  · Gallbladder stones.  · Infertility problems.  What are the causes?  Common causes of this condition include:  · Eating daily meals that are high in calories, sugar, and fat.  · Being born with genes that may make you more likely to become obese.  · Having a medical condition that causes obesity, including:  ? Hypothyroidism.  ? Polycystic ovarian syndrome (PCOS).  ? Binge-eating disorder.  ? Cushing syndrome.  · Taking certain medicines, such as steroids, antidepressants, and seizure medicines.  · Not being physically active (sedentary lifestyle).  · Not getting enough sleep.  · Drinking high amounts of sugar-sweetened beverages, such as soft drinks.  What increases the risk?  The following factors may make you more likely to develop this condition:  · Having a family history of obesity.  · Being a woman of  descent.  · Being a man of  descent.  · Living in an area with limited access to:  ? [place], recreation centers, or sidewalks.  ? Healthy food choices, such as grocery stores and farmers' markets.  What are the signs or symptoms?  The main sign of this condition is having too much body fat.  How is this diagnosed?  This condition is diagnosed based on:  · Your BMI. If you are an adult with a BMI of 30 or  higher, you are considered obese.  · Your waist circumference. This measures the distance around your waistline.  · Your skinfold thickness. Your health care provider may gently pinch a fold of your skin and measure it.  You may have other tests to check for underlying conditions.  How is this treated?  Treatment for this condition often includes changing your lifestyle. Treatment may include some or all of the following:  · Dietary changes. This may include developing a healthy meal plan.  · Regular physical activity. This may include activity that causes your heart to beat faster (aerobic exercise) and strength training. Work with your health care provider to design an exercise program that works for you.  · Medicine to help you lose weight if you are unable to lose 1 pound a week after 6 weeks of healthy eating and more physical activity.  · Treating conditions that cause the obesity (underlying conditions).  · Surgery. Surgical options may include gastric banding and gastric bypass. Surgery may be done if:  ? Other treatments have not helped to improve your condition.  ? You have a BMI of 40 or higher.  ? You have life-threatening health problems related to obesity.  Follow these instructions at home:  Eating and drinking    · Follow recommendations from your health care provider about what you eat and drink. Your health care provider may advise you to:  ? Limit fast food, sweets, and processed snack foods.  ? Choose low-fat options, such as low-fat milk instead of whole milk.  ? Eat 5 or more servings of fruits or vegetables every day.  ? Eat at home more often. This gives you more control over what you eat.  ? Choose healthy foods when you eat out.  ? Learn to read food labels. This will help you understand how much food is considered 1 serving.  ? Learn what a healthy serving size is.  ? Keep low-fat snacks available.  ? Limit sugary drinks, such as soda, fruit juice, sweetened iced tea, and flavored  milk.  · Drink enough water to keep your urine pale yellow.  · Do not follow a fad diet. Fad diets can be unhealthy and even dangerous.  Physical activity  · Exercise regularly, as told by your health care provider.  ? Most adults should get up to 150 minutes of moderate-intensity exercise every week.  ? Ask your health care provider what types of exercise are safe for you and how often you should exercise.  · Warm up and stretch before being active.  · Cool down and stretch after being active.  · Rest between periods of activity.  Lifestyle  · Work with your health care provider and a dietitian to set a weight-loss goal that is healthy and reasonable for you.  · Limit your screen time.  · Find ways to reward yourself that do not involve food.  · Do not drink alcohol if:  ? Your health care provider tells you not to drink.  ? You are pregnant, may be pregnant, or are planning to become pregnant.  · If you drink alcohol:  ? Limit how much you use to:  § 0-1 drink a day for women.  § 0-2 drinks a day for men.  ? Be aware of how much alcohol is in your drink. In the [place] one drink equals one 12 oz bottle of beer (355 mL), one 5 oz glass of wine (148 mL), or one 1½ oz glass of hard liquor (44 mL).  General instructions  · Keep a weight-loss journal to keep track of the food you eat and how much exercise you get.  · Take over-the-counter and prescription medicines only as told by your health care provider.  · Take vitamins and supplements only as told by your health care provider.  · Consider joining a support group. Your health care provider may be able to recommend a support group.  · Keep all follow-up visits as told by your health care provider. This is important.  Contact a health care provider if:  · You are unable to meet your weight loss goal after 6 weeks of dietary and lifestyle changes.  Get help right away if you are having:  · Trouble breathing.  · Suicidal thoughts or behaviors.  Summary  · Obesity is the  condition of having too much total body fat.  · Being overweight or obese means that your weight is greater than what is considered healthy for your body size.  · Work with your health care provider and a dietitian to set a weight-loss goal that is healthy and reasonable for you.  · Exercise regularly, as told by your health care provider. Ask your health care provider what types of exercise are safe for you and how often you should exercise.  This information is not intended to replace advice given to you by your health care provider. Make sure you discuss any questions you have with your health care provider.  Document Released: 01/25/2006 Document Revised: 08/22/2019 Document Reviewed: 08/22/2019  [employer] Patient Education © 2020 [employer]

## 2020-08-25 ENCOUNTER — OFFICE VISIT (OUTPATIENT)
Dept: FAMILY MEDICINE CLINIC | Facility: CLINIC | Age: 55
End: 2020-08-25

## 2020-08-25 VITALS
HEIGHT: 66 IN | SYSTOLIC BLOOD PRESSURE: 116 MMHG | BODY MASS INDEX: 29.27 KG/M2 | WEIGHT: 182.1 LBS | DIASTOLIC BLOOD PRESSURE: 78 MMHG | HEART RATE: 81 BPM | OXYGEN SATURATION: 99 %

## 2020-08-25 DIAGNOSIS — J30.1 SEASONAL ALLERGIC RHINITIS DUE TO POLLEN: ICD-10-CM

## 2020-08-25 DIAGNOSIS — L29.0 ANAL ITCHING: ICD-10-CM

## 2020-08-25 DIAGNOSIS — E66.09 CLASS 1 OBESITY DUE TO EXCESS CALORIES WITHOUT SERIOUS COMORBIDITY WITH BODY MASS INDEX (BMI) OF 33.0 TO 33.9 IN ADULT: Primary | ICD-10-CM

## 2020-08-25 DIAGNOSIS — H69.83 EUSTACHIAN TUBE DYSFUNCTION, BILATERAL: ICD-10-CM

## 2020-08-25 PROCEDURE — 99214 OFFICE O/P EST MOD 30 MIN: CPT | Performed by: NURSE PRACTITIONER

## 2020-08-25 RX ORDER — MONTELUKAST SODIUM 10 MG/1
10 TABLET ORAL NIGHTLY
Qty: 30 TABLET | Refills: 5 | Status: SHIPPED | OUTPATIENT
Start: 2020-08-25 | End: 2021-01-05 | Stop reason: SDUPTHER

## 2020-08-25 RX ORDER — AZELASTINE 1 MG/ML
2 SPRAY, METERED NASAL 2 TIMES DAILY
Qty: 1 EACH | Refills: 12 | Status: SHIPPED | OUTPATIENT
Start: 2020-08-25 | End: 2021-12-03

## 2020-08-25 NOTE — PATIENT INSTRUCTIONS
Obesity, Adult  Obesity is the condition of having too much total body fat. Being overweight or obese means that your weight is greater than what is considered healthy for your body size. Obesity is determined by a measurement called BMI. BMI is an estimate of body fat and is calculated from height and weight. For adults, a BMI of 30 or higher is considered obese.  Obesity can lead to other health concerns and major illnesses, including:  · Stroke.  · Coronary artery disease (CAD).  · Type 2 diabetes.  · Some types of cancer, including cancers of the colon, breast, uterus, and gallbladder.  · Osteoarthritis.  · High blood pressure (hypertension).  · High cholesterol.  · Sleep apnea.  · Gallbladder stones.  · Infertility problems.  What are the causes?  Common causes of this condition include:  · Eating daily meals that are high in calories, sugar, and fat.  · Being born with genes that may make you more likely to become obese.  · Having a medical condition that causes obesity, including:  ? Hypothyroidism.  ? Polycystic ovarian syndrome (PCOS).  ? Binge-eating disorder.  ? Cushing syndrome.  · Taking certain medicines, such as steroids, antidepressants, and seizure medicines.  · Not being physically active (sedentary lifestyle).  · Not getting enough sleep.  · Drinking high amounts of sugar-sweetened beverages, such as soft drinks.  What increases the risk?  The following factors may make you more likely to develop this condition:  · Having a family history of obesity.  · Being a woman of  descent.  · Being a man of  descent.  · Living in an area with limited access to:  ? [place], recreation centers, or sidewalks.  ? Healthy food choices, such as grocery stores and farmers' markets.  What are the signs or symptoms?  The main sign of this condition is having too much body fat.  How is this diagnosed?  This condition is diagnosed based on:  · Your BMI. If you are an adult with a BMI of 30 or  higher, you are considered obese.  · Your waist circumference. This measures the distance around your waistline.  · Your skinfold thickness. Your health care provider may gently pinch a fold of your skin and measure it.  You may have other tests to check for underlying conditions.  How is this treated?  Treatment for this condition often includes changing your lifestyle. Treatment may include some or all of the following:  · Dietary changes. This may include developing a healthy meal plan.  · Regular physical activity. This may include activity that causes your heart to beat faster (aerobic exercise) and strength training. Work with your health care provider to design an exercise program that works for you.  · Medicine to help you lose weight if you are unable to lose 1 pound a week after 6 weeks of healthy eating and more physical activity.  · Treating conditions that cause the obesity (underlying conditions).  · Surgery. Surgical options may include gastric banding and gastric bypass. Surgery may be done if:  ? Other treatments have not helped to improve your condition.  ? You have a BMI of 40 or higher.  ? You have life-threatening health problems related to obesity.  Follow these instructions at home:  Eating and drinking    · Follow recommendations from your health care provider about what you eat and drink. Your health care provider may advise you to:  ? Limit fast food, sweets, and processed snack foods.  ? Choose low-fat options, such as low-fat milk instead of whole milk.  ? Eat 5 or more servings of fruits or vegetables every day.  ? Eat at home more often. This gives you more control over what you eat.  ? Choose healthy foods when you eat out.  ? Learn to read food labels. This will help you understand how much food is considered 1 serving.  ? Learn what a healthy serving size is.  ? Keep low-fat snacks available.  ? Limit sugary drinks, such as soda, fruit juice, sweetened iced tea, and flavored  milk.  · Drink enough water to keep your urine pale yellow.  · Do not follow a fad diet. Fad diets can be unhealthy and even dangerous.  Physical activity  · Exercise regularly, as told by your health care provider.  ? Most adults should get up to 150 minutes of moderate-intensity exercise every week.  ? Ask your health care provider what types of exercise are safe for you and how often you should exercise.  · Warm up and stretch before being active.  · Cool down and stretch after being active.  · Rest between periods of activity.  Lifestyle  · Work with your health care provider and a dietitian to set a weight-loss goal that is healthy and reasonable for you.  · Limit your screen time.  · Find ways to reward yourself that do not involve food.  · Do not drink alcohol if:  ? Your health care provider tells you not to drink.  ? You are pregnant, may be pregnant, or are planning to become pregnant.  · If you drink alcohol:  ? Limit how much you use to:  § 0-1 drink a day for women.  § 0-2 drinks a day for men.  ? Be aware of how much alcohol is in your drink. In the [place] one drink equals one 12 oz bottle of beer (355 mL), one 5 oz glass of wine (148 mL), or one 1½ oz glass of hard liquor (44 mL).  General instructions  · Keep a weight-loss journal to keep track of the food you eat and how much exercise you get.  · Take over-the-counter and prescription medicines only as told by your health care provider.  · Take vitamins and supplements only as told by your health care provider.  · Consider joining a support group. Your health care provider may be able to recommend a support group.  · Keep all follow-up visits as told by your health care provider. This is important.  Contact a health care provider if:  · You are unable to meet your weight loss goal after 6 weeks of dietary and lifestyle changes.  Get help right away if you are having:  · Trouble breathing.  · Suicidal thoughts or behaviors.  Summary  · Obesity is the  "condition of having too much total body fat.  · Being overweight or obese means that your weight is greater than what is considered healthy for your body size.  · Work with your health care provider and a dietitian to set a weight-loss goal that is healthy and reasonable for you.  · Exercise regularly, as told by your health care provider. Ask your health care provider what types of exercise are safe for you and how often you should exercise.  This information is not intended to replace advice given to you by your health care provider. Make sure you discuss any questions you have with your health care provider.  Document Released: 01/25/2006 Document Revised: 08/22/2019 Document Reviewed: 08/22/2019  [employer] Patient Education © 2020 [employer]      Managing Your Hypertension  Hypertension is commonly called high blood pressure. This is when the force of your blood pressing against the walls of your arteries is too strong. Arteries are blood vessels that carry blood from your heart throughout your body. Hypertension forces the heart to work harder to pump blood, and may cause the arteries to become narrow or stiff. Having untreated or uncontrolled hypertension can cause heart attack, stroke, kidney disease, and other problems.  What are blood pressure readings?  A blood pressure reading consists of a higher number over a lower number. Ideally, your blood pressure should be below 120/80. The first (\"top\") number is called the systolic pressure. It is a measure of the pressure in your arteries as your heart beats. The second (\"bottom\") number is called the diastolic pressure. It is a measure of the pressure in your arteries as the heart relaxes.  What does my blood pressure reading mean?  Blood pressure is classified into four stages. Based on your blood pressure reading, your health care provider may use the following stages to determine what type of treatment you need, if any. Systolic pressure and diastolic " pressure are measured in a unit called mm Hg.  Normal  · Systolic pressure: below 120.  · Diastolic pressure: below 80.  Elevated  · Systolic pressure: 120-129.  · Diastolic pressure: below 80.  Hypertension stage 1  · Systolic pressure: 130-139.  · Diastolic pressure: 80-89.  Hypertension stage 2  · Systolic pressure: 140 or above.  · Diastolic pressure: 90 or above.  What health risks are associated with hypertension?  Managing your hypertension is an important responsibility. Uncontrolled hypertension can lead to:  · A heart attack.  · A stroke.  · A weakened blood vessel (aneurysm).  · Heart failure.  · Kidney damage.  · Eye damage.  · Metabolic syndrome.  · Memory and concentration problems.  What changes can I make to manage my hypertension?  Hypertension can be managed by making lifestyle changes and possibly by taking medicines. Your health care provider will help you make a plan to bring your blood pressure within a normal range.  Eating and drinking    · Eat a diet that is high in fiber and potassium, and low in salt (sodium), added sugar, and fat. An example eating plan is called the DASH (Dietary Approaches to Stop Hypertension) diet. To eat this way:  ? Eat plenty of fresh fruits and vegetables. Try to fill half of your plate at each meal with fruits and vegetables.  ? Eat whole grains, such as whole wheat pasta, brown rice, or whole grain bread. Fill about one quarter of your plate with whole grains.  ? Eat low-fat diary products.  ? Avoid fatty cuts of meat, processed or cured meats, and poultry with skin. Fill about one quarter of your plate with lean proteins such as fish, chicken without skin, beans, eggs, and tofu.  ? Avoid premade and processed foods. These tend to be higher in sodium, added sugar, and fat.  · Reduce your daily sodium intake. Most people with hypertension should eat less than 1,500 mg of sodium a day.  · Limit alcohol intake to no more than 1 drink a day for nonpregnant women and  2 drinks a day for men. One drink equals 12 oz of beer, 5 oz of wine, or 1½ oz of hard liquor.  Lifestyle  · Work with your health care provider to maintain a healthy body weight, or to lose weight. Ask what an ideal weight is for you.  · Get at least 30 minutes of exercise that causes your heart to beat faster (aerobic exercise) most days of the week. Activities may include walking, swimming, or biking.  · Include exercise to strengthen your muscles (resistance exercise), such as weight lifting, as part of your weekly exercise routine. Try to do these types of exercises for 30 minutes at least 3 days a week.  · Do not use any products that contain nicotine or tobacco, such as cigarettes and e-cigarettes. If you need help quitting, ask your health care provider.  · Control any long-term (chronic) conditions you have, such as high cholesterol or diabetes.  Monitoring  · Monitor your blood pressure at home as told by your health care provider. Your personal target blood pressure may vary depending on your medical conditions, your age, and other factors.  · Have your blood pressure checked regularly, as often as told by your health care provider.  Working with your health care provider  · Review all the medicines you take with your health care provider because there may be side effects or interactions.  · Talk with your health care provider about your diet, exercise habits, and other lifestyle factors that may be contributing to hypertension.  · Visit your health care provider regularly. Your health care provider can help you create and adjust your plan for managing hypertension.  Will I need medicine to control my blood pressure?  Your health care provider may prescribe medicine if lifestyle changes are not enough to get your blood pressure under control, and if:  · Your systolic blood pressure is 130 or higher.  · Your diastolic blood pressure is 80 or higher.  Take medicines only as told by your health care  provider. Follow the directions carefully. Blood pressure medicines must be taken as prescribed. The medicine does not work as well when you skip doses. Skipping doses also puts you at risk for problems.  Contact a health care provider if:  · You think you are having a reaction to medicines you have taken.  · You have repeated (recurrent) headaches.  · You feel dizzy.  · You have swelling in your ankles.  · You have trouble with your vision.  Get help right away if:  · You develop a severe headache or confusion.  · You have unusual weakness or numbness, or you feel faint.  · You have severe pain in your chest or abdomen.  · You vomit repeatedly.  · You have trouble breathing.  Summary  · Hypertension is when the force of blood pumping through your arteries is too strong. If this condition is not controlled, it may put you at risk for serious complications.  · Your personal target blood pressure may vary depending on your medical conditions, your age, and other factors. For most people, a normal blood pressure is less than 120/80.  · Hypertension is managed by lifestyle changes, medicines, or both. Lifestyle changes include weight loss, eating a healthy, low-sodium diet, exercising more, and limiting alcohol.  This information is not intended to replace advice given to you by your health care provider. Make sure you discuss any questions you have with your health care provider.  Document Released: 09/11/2013 Document Revised: 04/10/2020 Document Reviewed: 11/15/2017  [employer] Patient Education © 2020 [employer]

## 2020-08-25 NOTE — PROGRESS NOTES
Chief Complaint   Patient presents with   • Obesity   • Hypertension       Subjective   Tammi Richardson is a 55 y.o. female    History of Present Illness  Patient today for follow-up on high blood pressure and weight she has currently lost a total of 21 pounds since December 2019.  She is currently taking Saxenda and tolerating this medication well.  She also reports her blood pressures been running very good.    She does feel like her right ear is constantly clogged.  This been going on for the past couple years.  She does use Claritin daily and Flonase twice a day.     Has noted some anal itching, denies history of hemorrhoids, has BM 1-2 per day.       Answers for HPI/ROS submitted by the patient on 8/23/2020   What is the primary reason for your visit?: Other  Please describe your symptoms.: No symptoms. Follow up from a dehydration visit to the ER in February.  Have you had these symptoms before?: No  How long have you been having these symptoms?: 1-4 days  Please list any medications you are currently taking for this condition.: None.  Please describe any probable cause for these symptoms. : None.      Allergies   Allergen Reactions   • Mobic [Meloxicam] GI Intolerance   • Nsaids GI Intolerance     Past Medical History:   Diagnosis Date   • DDD (degenerative disc disease), lumbar    • History of gallstones    • Obesity       Past Surgical History:   Procedure Laterality Date   • CHOLECYSTECTOMY     • TOE SURGERY Left     Fifth toe     Social History     Socioeconomic History   • Marital status: Single     Spouse name: Not on file   • Number of children: Not on file   • Years of education: Not on file   • Highest education level: Not on file   Tobacco Use   • Smoking status: Never Smoker   • Smokeless tobacco: Never Used   Substance and Sexual Activity   • Alcohol use: Yes     Comment: Rarely   • Drug use: No   • Sexual activity: Defer        Current Outpatient Medications:   •  estradiol (MINIVELLE,  VIVELLE-DOT) 0.05 MG/24HR patch, , Disp: , Rfl:   •  fluticasone (FLONASE) 50 MCG/ACT nasal spray, 1 spray into the nostril(s) as directed by provider Daily., Disp: 3 bottle, Rfl: 3  •  ibuprofen (ADVIL,MOTRIN) 800 MG tablet, Take 1 tablet by mouth Every 6 (Six) Hours As Needed for Mild Pain ., Disp: 90 tablet, Rfl: 1  •  Liraglutide -Weight Management (Saxenda) 18 MG/3ML solution pen-injector, Start 0.6 mg QDX1 wk, then 1.2 mg QD X1wk, then 1.8 mg QD for 1 wk, then 2.4 mg QD for 1 wk, then 3 mg QD thereafter, Disp: 5 pen, Rfl: 5  •  progesterone (PROMETRIUM) 100 MG capsule, , Disp: , Rfl:   •  spironolactone (ALDACTONE) 50 MG tablet, , Disp: , Rfl:   •  azelastine (ASTELIN) 0.1 % nasal spray, 2 sprays into the nostril(s) as directed by provider 2 (Two) Times a Day., Disp: 1 each, Rfl: 12  •  montelukast (Singulair) 10 MG tablet, Take 1 tablet by mouth Every Night., Disp: 30 tablet, Rfl: 5     Review of Systems   Constitutional: Negative for chills, fatigue, fever and unexpected weight change.   Respiratory: Negative for cough, chest tightness, shortness of breath and wheezing.    Cardiovascular: Negative for chest pain, palpitations and leg swelling.   Gastrointestinal: Positive for rectal pain (itching). Negative for constipation, diarrhea, nausea and vomiting.   Genitourinary: Negative for difficulty urinating and dysuria.   Skin: Negative for color change and rash.   Neurological: Negative for dizziness, syncope, weakness and headaches.   Psychiatric/Behavioral: Negative for sleep disturbance.       Objective     Vitals:    08/25/20 0803   BP: 116/78   Pulse: 81   SpO2: 99%       Results for orders placed or performed in visit on 02/21/20   Basic Metabolic Panel   Result Value Ref Range    Glucose 100 (H) 65 - 99 mg/dL    BUN 17 6 - 20 mg/dL    Creatinine 1.10 (H) 0.57 - 1.00 mg/dL    Sodium 135 (L) 136 - 145 mmol/L    Potassium 4.5 3.5 - 5.2 mmol/L    Chloride 100 98 - 107 mmol/L    CO2 24.3 22.0 - 29.0 mmol/L     Calcium 9.6 8.6 - 10.5 mg/dL    eGFR  African Amer 63 >60 mL/min/1.73    BUN/Creatinine Ratio 15.5 7.0 - 25.0    Anion Gap 10.7 5.0 - 15.0 mmol/L       Physical Exam   Constitutional: She is oriented to person, place, and time. She appears well-developed and well-nourished.   HENT:   Head: Normocephalic and atraumatic.   Cardiovascular: Normal rate, regular rhythm and normal heart sounds.   Pulmonary/Chest: Effort normal and breath sounds normal.   Musculoskeletal: She exhibits no edema.   Neurological: She is alert and oriented to person, place, and time.   Skin: Skin is warm and dry.   Psychiatric: She has a normal mood and affect. Her behavior is normal.   Vitals reviewed.      Assessment/Plan   Problem List Items Addressed This Visit        Digestive    Class 1 obesity due to excess calories without serious comorbidity with body mass index (BMI) of 33.0 to 33.9 in adult - Primary       Nervous and Auditory    Eustachian tube dysfunction, bilateral    Relevant Medications    azelastine (ASTELIN) 0.1 % nasal spray    montelukast (Singulair) 10 MG tablet      Other Visit Diagnoses     Seasonal allergic rhinitis due to pollen        Relevant Medications    azelastine (ASTELIN) 0.1 % nasal spray    montelukast (Singulair) 10 MG tablet    Anal itching          Discussed need for weight management. Discussed weight loss with diet, recommend Weight Watchers or Mediterranean Diet, but stated that whatever method works for this patient is best. Reviewed need for regular exercise.  The patient agrees with all recommendations at this time. I have discussed a weight loss plan to be determined by this patient.  We will continue her on Saxenda at this time.    Allergic rhinitis- Patient was encouraged to keep me informed of any acute changes, lack of improvement, or any new concerning symptoms.  If patient becomes concerned, or has any worsening symptoms, they are to report either here, urgent treatment, or emergency room.  Plan of care discussed with pt. They verbalized understanding and agreement.     Can use OTC steroid cream PRN for itching. Patient was encouraged to keep me informed of any acute changes, lack of improvement, or any new concerning symptoms.  If patient becomes concerned, or has any worsening symptoms, they are to report either here, urgent treatment, or emergency room. Plan of care discussed with pt. They verbalized understanding and agreement.     RV- 5 mo for wellness    Counseling provided on weight management and allergic rhinitis.    Star Garcia, APRN   8/25/2020   08:43

## 2020-09-03 ENCOUNTER — TELEPHONE (OUTPATIENT)
Dept: FAMILY MEDICINE CLINIC | Facility: CLINIC | Age: 55
End: 2020-09-03

## 2020-09-03 DIAGNOSIS — Z80.0 FAMILY HISTORY OF COLON CANCER: ICD-10-CM

## 2020-09-03 DIAGNOSIS — Z12.11 SCREEN FOR COLON CANCER: Primary | ICD-10-CM

## 2020-09-22 DIAGNOSIS — Z12.11 SCREENING FOR COLON CANCER: Primary | ICD-10-CM

## 2020-09-29 ENCOUNTER — APPOINTMENT (OUTPATIENT)
Dept: PREADMISSION TESTING | Facility: HOSPITAL | Age: 55
End: 2020-09-29

## 2020-09-29 PROCEDURE — C9803 HOPD COVID-19 SPEC COLLECT: HCPCS

## 2020-09-29 PROCEDURE — U0004 COV-19 TEST NON-CDC HGH THRU: HCPCS

## 2020-09-30 ENCOUNTER — TELEPHONE (OUTPATIENT)
Dept: GASTROENTEROLOGY | Facility: CLINIC | Age: 55
End: 2020-09-30

## 2020-09-30 LAB — SARS-COV-2 RNA NOSE QL NAA+PROBE: NOT DETECTED

## 2020-09-30 NOTE — TELEPHONE ENCOUNTER
Pt called stating she can not use preps that contain magnesium. I spoke to Kathi ([facility]/Dr. Carter), Plenvu was okay to give to patient; pt was advised she can come to office to  sample prep. Understanding was voiced and pt will get prep today.

## 2020-10-06 DIAGNOSIS — Z12.11 SCREEN FOR COLON CANCER: Primary | ICD-10-CM

## 2020-10-23 RX ORDER — SODIUM, POTASSIUM,MAG SULFATES 17.5-3.13G
2 SOLUTION, RECONSTITUTED, ORAL ORAL TAKE AS DIRECTED
Qty: 354 ML | Refills: 0 | Status: SHIPPED | OUTPATIENT
Start: 2020-10-23 | End: 2020-12-29

## 2020-10-25 ENCOUNTER — APPOINTMENT (OUTPATIENT)
Dept: PREADMISSION TESTING | Facility: HOSPITAL | Age: 55
End: 2020-10-25

## 2020-10-30 ENCOUNTER — APPOINTMENT (OUTPATIENT)
Dept: PREADMISSION TESTING | Facility: HOSPITAL | Age: 55
End: 2020-10-30

## 2020-10-30 PROCEDURE — C9803 HOPD COVID-19 SPEC COLLECT: HCPCS

## 2020-10-30 PROCEDURE — U0004 COV-19 TEST NON-CDC HGH THRU: HCPCS

## 2020-10-31 LAB — SARS-COV-2 RNA RESP QL NAA+PROBE: NOT DETECTED

## 2020-11-02 ENCOUNTER — OUTSIDE FACILITY SERVICE (OUTPATIENT)
Dept: GASTROENTEROLOGY | Facility: CLINIC | Age: 55
End: 2020-11-02

## 2020-11-02 PROCEDURE — 45378 DIAGNOSTIC COLONOSCOPY: CPT | Performed by: INTERNAL MEDICINE

## 2020-12-09 ENCOUNTER — TRANSCRIBE ORDERS (OUTPATIENT)
Dept: ADMINISTRATIVE | Facility: HOSPITAL | Age: 55
End: 2020-12-09

## 2020-12-09 DIAGNOSIS — Z12.31 VISIT FOR SCREENING MAMMOGRAM: Primary | ICD-10-CM

## 2020-12-29 ENCOUNTER — OFFICE VISIT (OUTPATIENT)
Dept: FAMILY MEDICINE CLINIC | Facility: CLINIC | Age: 55
End: 2020-12-29

## 2020-12-29 VITALS
OXYGEN SATURATION: 98 % | HEIGHT: 66 IN | DIASTOLIC BLOOD PRESSURE: 76 MMHG | HEART RATE: 76 BPM | BODY MASS INDEX: 28.73 KG/M2 | SYSTOLIC BLOOD PRESSURE: 118 MMHG | WEIGHT: 178.8 LBS

## 2020-12-29 DIAGNOSIS — R19.5 STRONG ODOR OF STOOLS: ICD-10-CM

## 2020-12-29 DIAGNOSIS — K59.04 CHRONIC IDIOPATHIC CONSTIPATION: Primary | ICD-10-CM

## 2020-12-29 PROCEDURE — 99213 OFFICE O/P EST LOW 20 MIN: CPT | Performed by: NURSE PRACTITIONER

## 2021-01-05 ENCOUNTER — OFFICE VISIT (OUTPATIENT)
Dept: FAMILY MEDICINE CLINIC | Facility: CLINIC | Age: 56
End: 2021-01-05

## 2021-01-05 ENCOUNTER — LAB (OUTPATIENT)
Dept: LAB | Facility: HOSPITAL | Age: 56
End: 2021-01-05

## 2021-01-05 VITALS
HEART RATE: 83 BPM | HEIGHT: 66 IN | WEIGHT: 173 LBS | BODY MASS INDEX: 27.8 KG/M2 | SYSTOLIC BLOOD PRESSURE: 116 MMHG | OXYGEN SATURATION: 98 % | DIASTOLIC BLOOD PRESSURE: 60 MMHG | TEMPERATURE: 96.9 F

## 2021-01-05 DIAGNOSIS — Z13.220 SCREENING FOR LIPID DISORDERS: ICD-10-CM

## 2021-01-05 DIAGNOSIS — Z00.00 WELLNESS EXAMINATION: Primary | ICD-10-CM

## 2021-01-05 DIAGNOSIS — K59.04 CHRONIC IDIOPATHIC CONSTIPATION: ICD-10-CM

## 2021-01-05 DIAGNOSIS — Z20.2 EXPOSURE TO STD: ICD-10-CM

## 2021-01-05 DIAGNOSIS — Z00.00 WELLNESS EXAMINATION: ICD-10-CM

## 2021-01-05 DIAGNOSIS — H69.83 EUSTACHIAN TUBE DYSFUNCTION, BILATERAL: ICD-10-CM

## 2021-01-05 DIAGNOSIS — Z13.1 SCREENING FOR DIABETES MELLITUS (DM): ICD-10-CM

## 2021-01-05 DIAGNOSIS — B37.31 VAGINAL YEAST INFECTION: ICD-10-CM

## 2021-01-05 DIAGNOSIS — J30.1 SEASONAL ALLERGIC RHINITIS DUE TO POLLEN: ICD-10-CM

## 2021-01-05 DIAGNOSIS — E66.09 CLASS 1 OBESITY DUE TO EXCESS CALORIES WITHOUT SERIOUS COMORBIDITY WITH BODY MASS INDEX (BMI) OF 33.0 TO 33.9 IN ADULT: ICD-10-CM

## 2021-01-05 LAB
BILIRUB BLD-MCNC: NEGATIVE MG/DL
CLARITY, POC: CLEAR
COLOR UR: YELLOW
GLUCOSE UR STRIP-MCNC: NEGATIVE MG/DL
HIV1+2 AB SER QL: NORMAL
KETONES UR QL: NEGATIVE
LEUKOCYTE EST, POC: NEGATIVE
NITRITE UR-MCNC: NEGATIVE MG/ML
PH UR: 5.5 [PH] (ref 5–8)
PROT UR STRIP-MCNC: NEGATIVE MG/DL
RBC # UR STRIP: ABNORMAL /UL
SP GR UR: 1.02 (ref 1–1.03)
UROBILINOGEN UR QL: NORMAL

## 2021-01-05 PROCEDURE — G0432 EIA HIV-1/HIV-2 SCREEN: HCPCS

## 2021-01-05 PROCEDURE — 80053 COMPREHEN METABOLIC PANEL: CPT

## 2021-01-05 PROCEDURE — 99396 PREV VISIT EST AGE 40-64: CPT | Performed by: NURSE PRACTITIONER

## 2021-01-05 PROCEDURE — 86592 SYPHILIS TEST NON-TREP QUAL: CPT

## 2021-01-05 PROCEDURE — 80061 LIPID PANEL: CPT

## 2021-01-05 PROCEDURE — 81003 URINALYSIS AUTO W/O SCOPE: CPT | Performed by: NURSE PRACTITIONER

## 2021-01-05 PROCEDURE — 86695 HERPES SIMPLEX TYPE 1 TEST: CPT

## 2021-01-05 PROCEDURE — 36415 COLL VENOUS BLD VENIPUNCTURE: CPT

## 2021-01-05 PROCEDURE — 86696 HERPES SIMPLEX TYPE 2 TEST: CPT

## 2021-01-05 RX ORDER — MONTELUKAST SODIUM 10 MG/1
10 TABLET ORAL NIGHTLY
Qty: 90 TABLET | Refills: 3 | Status: SHIPPED | OUTPATIENT
Start: 2021-01-05 | End: 2021-12-03

## 2021-01-05 RX ORDER — FLUCONAZOLE 150 MG/1
150 TABLET ORAL ONCE
Qty: 1 TABLET | Refills: 1 | Status: SHIPPED | OUTPATIENT
Start: 2021-01-05 | End: 2021-01-05

## 2021-01-05 RX ORDER — LIRAGLUTIDE 6 MG/ML
INJECTION, SOLUTION SUBCUTANEOUS
Qty: 5 PEN | Refills: 5 | Status: SHIPPED | OUTPATIENT
Start: 2021-01-05 | End: 2021-12-03 | Stop reason: SDUPTHER

## 2021-01-05 NOTE — PROGRESS NOTES
Patient Care Team:  Star Garcia APRN as PCP - General (Family Medicine)     Chief complaint: Patient is in today for a physical     Patient is a 55 y.o. female who presents for her yearly physical exam.     HPI patient today for routine yearly physical exam.  She is on Saxenda for weight loss and has lost in excess of 30 pounds.  She also was recently seen for constipation issues and is on Linzess 290 mg.  This seems to be working well for her.  She was in a relationship recently and has concerns whether he was monogamous. Wants tested, she also in the past 3 days he started having a vaginal discharge is concerned she may have a vaginal yeast infection.    Review of Systems   Constitutional: Negative for appetite change, chills, fatigue and fever.   HENT: Negative for congestion, ear pain, hearing loss, rhinorrhea, sinus pressure and sore throat.    Eyes: Negative for itching and visual disturbance (glasses).   Respiratory: Negative for cough, shortness of breath and wheezing.    Cardiovascular: Negative for chest pain, palpitations and leg swelling.   Gastrointestinal: Positive for constipation. Negative for abdominal pain, blood in stool, diarrhea, nausea and vomiting.   Endocrine: Negative for cold intolerance, heat intolerance, polydipsia, polyphagia and polyuria.   Genitourinary: Positive for vaginal discharge (started 2 days ago). Negative for difficulty urinating, dysuria, hematuria and menstrual problem (irregular).   Musculoskeletal: Positive for arthralgias ( R shoulder). Negative for back pain, joint swelling, myalgias and neck pain.   Skin: Negative for rash and wound.        Seeing derm   Allergic/Immunologic: Negative for environmental allergies and food allergies.   Neurological: Negative for dizziness, light-headedness, numbness and headaches.   Hematological: Negative for adenopathy. Does not bruise/bleed easily.   Psychiatric/Behavioral: Negative for dysphoric mood and sleep disturbance.  The patient is not nervous/anxious.       History  Past Medical History:   Diagnosis Date   • DDD (degenerative disc disease), lumbar    • History of gallstones    • Obesity       Past Surgical History:   Procedure Laterality Date   • CHOLECYSTECTOMY     • TOE SURGERY Left     Fifth toe      Allergies   Allergen Reactions   • Mobic [Meloxicam] GI Intolerance   • Nsaids GI Intolerance      Family History   Problem Relation Age of Onset   • Lung cancer Mother    • Stomach cancer Father    • Heart disease Brother    • Breast cancer Neg Hx    • Ovarian cancer Neg Hx      Social History     Socioeconomic History   • Marital status: Single     Spouse name: Not on file   • Number of children: Not on file   • Years of education: Not on file   • Highest education level: Not on file   Tobacco Use   • Smoking status: Never Smoker   • Smokeless tobacco: Never Used   Substance and Sexual Activity   • Alcohol use: Yes     Comment: Rarely   • Drug use: No   • Sexual activity: Defer        Current Outpatient Medications:   •  azelastine (ASTELIN) 0.1 % nasal spray, 2 sprays into the nostril(s) as directed by provider 2 (Two) Times a Day., Disp: 1 each, Rfl: 12  •  estradiol (MINIVELLE, VIVELLE-DOT) 0.05 MG/24HR patch, , Disp: , Rfl:   •  fluticasone (FLONASE) 50 MCG/ACT nasal spray, 1 spray into the nostril(s) as directed by provider Daily., Disp: 3 bottle, Rfl: 3  •  ibuprofen (ADVIL,MOTRIN) 800 MG tablet, Take 1 tablet by mouth Every 6 (Six) Hours As Needed for Mild Pain ., Disp: 90 tablet, Rfl: 1  •  linaclotide (Linzess) 290 MCG capsule capsule, Take 1 capsule by mouth Every Morning Before Breakfast., Disp: 90 capsule, Rfl: 3  •  Liraglutide -Weight Management (Saxenda) 18 MG/3ML solution pen-injector, Start 0.6 mg QDX1 wk, then 1.2 mg QD X1wk, then 1.8 mg QD for 1 wk, then 2.4 mg QD for 1 wk, then 3 mg QD thereafter, Disp: 5 pen, Rfl: 5  •  montelukast (Singulair) 10 MG tablet, Take 1 tablet by mouth Every Night., Disp: 90  tablet, Rfl: 3  •  spironolactone (ALDACTONE) 50 MG tablet, , Disp: , Rfl:   •  fluconazole (Diflucan) 150 MG tablet, Take 1 tablet by mouth 1 (One) Time for 1 dose., Disp: 1 tablet, Rfl: 1    Immunizations   N/A   Prescribed/Refused   Date     Td/Tdap  (Booster Q 10 yrs)   [x]           Prescribed    []     Refused        []           Flu  (Yearly)   [x]        Prescribed    []     Refused        []           Pneumovax  (1 yr after Prevnar)   [x]        Prescribed    []     Refused        []           Prevnar 13  (1 yr after Pneumo)   [x]        Prescribed    []     Refused        []           Hep B     [x]        Prescribed    []     Refused        []           Shingles  (Age 50 and older)   []        Prescribed    [x]     Refused        []           Immunization History   Administered Date(s) Administered   • Flulaval/Fluarix/Fluzone Quad 10/06/2020   • Hepatitis A 12/31/2018, 07/05/2019   • Influenza, Unspecified 10/25/2018, 10/30/2019   • Shingrix 01/03/2020   • Tdap 01/01/2010, 01/03/2020     Health Maintenance   Topic Date Due   • ZOSTER VACCINE (2 of 2) 02/28/2020   • PAP SMEAR  12/28/2020   • ANNUAL PHYSICAL  01/06/2022   • MAMMOGRAM  03/06/2022   • COLONOSCOPY  11/02/2025   • TDAP/TD VACCINES (3 - Td) 01/03/2030   • HEPATITIS C SCREENING  Completed   • INFLUENZA VACCINE  Completed   • Pneumococcal Vaccine 0-64  Aged Out   • MENINGOCOCCAL VACCINE  Aged Out        Diabetes  [] Yes  [x] No   N/A      Date     Eye Exam     []             []   Complete     []   Recommended Date:  Where:       Foot Exam     []         []   Complete          Obesity Counseling     []       []   Complete     No results found for: HGBA1C, MICROALBUR    Additional Testing      Date     Colorectal Screening       []   N/A   [x]   Complete    []   Ordered     Date:    Where:       Pap      []   N/A   []   Complete   [x]   OB/GYN Date:    Where:       Mammogram        []   N/A   []   Complete   []   Ordered Date: march    Where:  "    PSA  (Over age 50)    [x]   N/A   []   Complete   []   Ordered Date:    Where:     US Aorta  (For male smokers, age 65)     [x]   N/A   []   Complete   []   Ordered Date:    Where:     CT for Smoker  (Age 55-75, 30 pk yr)    [x]   N/A   []   Complete   []   Ordered Date:    Where:     Bone Density/DEXA      [x]   N/A   []   Complete   []   Ordered Date:    Follow-up:     Hep. C  ( 1262-7160)      []   N/A   [x]   Complete   []   Ordered Date:    Where:     Results for orders placed or performed in visit on 21   POC Urinalysis Dipstick, Automated    Specimen: Urine   Result Value Ref Range    Color Yellow Yellow, Straw, Dark Yellow, Madeline    Clarity, UA Clear Clear    Specific Gravity  1.025 1.005 - 1.030    pH, Urine 5.5 5.0 - 8.0    Leukocytes Negative Negative    Nitrite, UA Negative Negative    Protein, POC Negative Negative mg/dL    Glucose, UA Negative Negative, 1000 mg/dL (3+) mg/dL    Ketones, UA Negative Negative    Urobilinogen, UA Normal Normal    Bilirubin Negative Negative    Blood, UA Trace (A) Negative            /60   Pulse 83   Temp 96.9 °F (36.1 °C)   Ht 167.6 cm (65.98\")   Wt 78.5 kg (173 lb)   SpO2 98%   BMI 27.94 kg/m²       Physical Exam  Vitals signs reviewed.   Constitutional:       Appearance: She is well-developed.   HENT:      Head: Normocephalic and atraumatic.      Right Ear: External ear normal. A middle ear effusion is present.      Left Ear: External ear normal. A middle ear effusion is present.   Eyes:      Pupils: Pupils are equal, round, and reactive to light.   Neck:      Musculoskeletal: Normal range of motion and neck supple.      Thyroid: No thyromegaly.      Vascular: No JVD.   Cardiovascular:      Rate and Rhythm: Normal rate and regular rhythm.      Heart sounds: Normal heart sounds.   Pulmonary:      Effort: Pulmonary effort is normal. No retractions.      Breath sounds: Normal breath sounds.   Chest:      Chest wall: No mass, lacerations, deformity, " swelling, tenderness, crepitus or edema.      Breasts: Breasts are symmetrical.     Abdominal:      General: Bowel sounds are normal. There is no distension.      Palpations: Abdomen is soft.      Tenderness: There is no abdominal tenderness. There is no guarding.   Genitourinary:     Comments: deferred  Musculoskeletal: Normal range of motion.   Lymphadenopathy:      Cervical: No cervical adenopathy.   Skin:     General: Skin is warm and dry.      Findings: No erythema or rash.   Neurological:      Mental Status: She is alert and oriented to person, place, and time.   Psychiatric:         Behavior: Behavior normal.             Counseling provided on weight management.    Diagnoses and all orders for this visit:    Wellness examination  -     Comprehensive Metabolic Panel; Future  -     Lipid Panel; Future  -     Chlamydia trachomatis, Neisseria gonorrhoeae, PCR - Urine, Urine, Clean Catch; Future  -     HIV-1 / O / 2 Ag / Antibody 4th Generation; Future  -     RPR; Future  -     HSV 1 & 2 - Specific Antibody, IgG; Future  -     POC Urinalysis Dipstick, Automated    Class 1 obesity due to excess calories without serious comorbidity with body mass index (BMI) of 33.0 to 33.9 in adult  -     Liraglutide -Weight Management (Saxenda) 18 MG/3ML solution pen-injector; Start 0.6 mg QDX1 wk, then 1.2 mg QD X1wk, then 1.8 mg QD for 1 wk, then 2.4 mg QD for 1 wk, then 3 mg QD thereafter    Screening for diabetes mellitus (DM)  -     Comprehensive Metabolic Panel; Future    Screening for lipid disorders  -     Lipid Panel; Future    Exposure to STD  -     Chlamydia trachomatis, Neisseria gonorrhoeae, PCR - Urine, Urine, Clean Catch; Future  -     HIV-1 / O / 2 Ag / Antibody 4th Generation; Future  -     RPR; Future  -     HSV 1 & 2 - Specific Antibody, IgG; Future  -     POC Urinalysis Dipstick, Automated    Chronic idiopathic constipation  -     linaclotide (Linzess) 290 MCG capsule capsule; Take 1 capsule by mouth Every  Morning Before Breakfast.    Seasonal allergic rhinitis due to pollen  -     montelukast (Singulair) 10 MG tablet; Take 1 tablet by mouth Every Night.    Eustachian tube dysfunction, bilateral  -     montelukast (Singulair) 10 MG tablet; Take 1 tablet by mouth Every Night.    Vaginal yeast infection  -     fluconazole (Diflucan) 150 MG tablet; Take 1 tablet by mouth 1 (One) Time for 1 dose.    The wellness exam has been reviewed in detail.  The patient has been fully counseled on preventative guidelines for vaccines, cancer screenings, and other health maintenance needs.  Functional testing has been performed to assess capacity for independent living and need for other medical interventions.   The patient was counseled on maintaining a lifestyle to promote good health and to minimize chronic diseases.  The patient has been assisted with scheduling healthcare procedures for the coming year and given a written document outlining these recommendations.     Discussed need for weight management. Discussed weight loss with diet, recommend Weight Watchers or Mediterranean Diet, but stated that whatever method works for this patient is best. Reviewed need for regular exercise.  The patient agrees with all recommendations at this time. I have discussed a weight loss plan to be determined by this patient.     Patient has been given a handout on allergic rhinitis and recommended to follow these guidelines and take recommended medications as directed. Patient was encouraged to keep me informed of any acute changes, lack of improvement, or any new concerning symptoms.  If patient becomes concerned, or has any worsening symptoms, they are to report either here, urgent treatment, or emergency room. Plan of care discussed with pt. They verbalized understanding and agreement.     RV- 1 year    Star Garcia, APRN   1/5/2021   12:03 EST

## 2021-01-05 NOTE — PATIENT INSTRUCTIONS
Obesity, Adult  Obesity is the condition of having too much total body fat. Being overweight or obese means that your weight is greater than what is considered healthy for your body size. Obesity is determined by a measurement called BMI. BMI is an estimate of body fat and is calculated from height and weight. For adults, a BMI of 30 or higher is considered obese.  Obesity can lead to other health concerns and major illnesses, including:  · Stroke.  · Coronary artery disease (CAD).  · Type 2 diabetes.  · Some types of cancer, including cancers of the colon, breast, uterus, and gallbladder.  · Osteoarthritis.  · High blood pressure (hypertension).  · High cholesterol.  · Sleep apnea.  · Gallbladder stones.  · Infertility problems.  What are the causes?  Common causes of this condition include:  · Eating daily meals that are high in calories, sugar, and fat.  · Being born with genes that may make you more likely to become obese.  · Having a medical condition that causes obesity, including:  ? Hypothyroidism.  ? Polycystic ovarian syndrome (PCOS).  ? Binge-eating disorder.  ? Cushing syndrome.  · Taking certain medicines, such as steroids, antidepressants, and seizure medicines.  · Not being physically active (sedentary lifestyle).  · Not getting enough sleep.  · Drinking high amounts of sugar-sweetened beverages, such as soft drinks.  What increases the risk?  The following factors may make you more likely to develop this condition:  · Having a family history of obesity.  · Being a woman of  descent.  · Being a man of  descent.  · Living in an area with limited access to:  ? [place], recreation centers, or sidewalks.  ? Healthy food choices, such as grocery stores and farmers' markets.  What are the signs or symptoms?  The main sign of this condition is having too much body fat.  How is this diagnosed?  This condition is diagnosed based on:  · Your BMI. If you are an adult with a BMI of 30 or  higher, you are considered obese.  · Your waist circumference. This measures the distance around your waistline.  · Your skinfold thickness. Your health care provider may gently pinch a fold of your skin and measure it.  You may have other tests to check for underlying conditions.  How is this treated?  Treatment for this condition often includes changing your lifestyle. Treatment may include some or all of the following:  · Dietary changes. This may include developing a healthy meal plan.  · Regular physical activity. This may include activity that causes your heart to beat faster (aerobic exercise) and strength training. Work with your health care provider to design an exercise program that works for you.  · Medicine to help you lose weight if you are unable to lose 1 pound a week after 6 weeks of healthy eating and more physical activity.  · Treating conditions that cause the obesity (underlying conditions).  · Surgery. Surgical options may include gastric banding and gastric bypass. Surgery may be done if:  ? Other treatments have not helped to improve your condition.  ? You have a BMI of 40 or higher.  ? You have life-threatening health problems related to obesity.  Follow these instructions at home:  Eating and drinking    · Follow recommendations from your health care provider about what you eat and drink. Your health care provider may advise you to:  ? Limit fast food, sweets, and processed snack foods.  ? Choose low-fat options, such as low-fat milk instead of whole milk.  ? Eat 5 or more servings of fruits or vegetables every day.  ? Eat at home more often. This gives you more control over what you eat.  ? Choose healthy foods when you eat out.  ? Learn to read food labels. This will help you understand how much food is considered 1 serving.  ? Learn what a healthy serving size is.  ? Keep low-fat snacks available.  ? Limit sugary drinks, such as soda, fruit juice, sweetened iced tea, and flavored  milk.  · Drink enough water to keep your urine pale yellow.  · Do not follow a fad diet. Fad diets can be unhealthy and even dangerous.  Physical activity  · Exercise regularly, as told by your health care provider.  ? Most adults should get up to 150 minutes of moderate-intensity exercise every week.  ? Ask your health care provider what types of exercise are safe for you and how often you should exercise.  · Warm up and stretch before being active.  · Cool down and stretch after being active.  · Rest between periods of activity.  Lifestyle  · Work with your health care provider and a dietitian to set a weight-loss goal that is healthy and reasonable for you.  · Limit your screen time.  · Find ways to reward yourself that do not involve food.  · Do not drink alcohol if:  ? Your health care provider tells you not to drink.  ? You are pregnant, may be pregnant, or are planning to become pregnant.  · If you drink alcohol:  ? Limit how much you use to:  § 0-1 drink a day for women.  § 0-2 drinks a day for men.  ? Be aware of how much alcohol is in your drink. In the [place] one drink equals one 12 oz bottle of beer (355 mL), one 5 oz glass of wine (148 mL), or one 1½ oz glass of hard liquor (44 mL).  General instructions  · Keep a weight-loss journal to keep track of the food you eat and how much exercise you get.  · Take over-the-counter and prescription medicines only as told by your health care provider.  · Take vitamins and supplements only as told by your health care provider.  · Consider joining a support group. Your health care provider may be able to recommend a support group.  · Keep all follow-up visits as told by your health care provider. This is important.  Contact a health care provider if:  · You are unable to meet your weight loss goal after 6 weeks of dietary and lifestyle changes.  Get help right away if you are having:  · Trouble breathing.  · Suicidal thoughts or behaviors.  Summary  · Obesity is the  condition of having too much total body fat.  · Being overweight or obese means that your weight is greater than what is considered healthy for your body size.  · Work with your health care provider and a dietitian to set a weight-loss goal that is healthy and reasonable for you.  · Exercise regularly, as told by your health care provider. Ask your health care provider what types of exercise are safe for you and how often you should exercise.  This information is not intended to replace advice given to you by your health care provider. Make sure you discuss any questions you have with your health care provider.  Document Revised: 08/22/2019 Document Reviewed: 08/22/2019  Elsevier Patient Education © 2020 [employer]

## 2021-01-06 LAB
ALBUMIN SERPL-MCNC: 4.4 G/DL (ref 3.5–5.2)
ALBUMIN/GLOB SERPL: 1.8 G/DL
ALP SERPL-CCNC: 57 U/L (ref 39–117)
ALT SERPL W P-5'-P-CCNC: 17 U/L (ref 1–33)
ANION GAP SERPL CALCULATED.3IONS-SCNC: 7.1 MMOL/L (ref 5–15)
AST SERPL-CCNC: 18 U/L (ref 1–32)
BILIRUB SERPL-MCNC: 0.5 MG/DL (ref 0–1.2)
BUN SERPL-MCNC: 13 MG/DL (ref 6–20)
BUN/CREAT SERPL: 11.7 (ref 7–25)
CALCIUM SPEC-SCNC: 9.6 MG/DL (ref 8.6–10.5)
CHLORIDE SERPL-SCNC: 103 MMOL/L (ref 98–107)
CHOLEST SERPL-MCNC: 188 MG/DL (ref 0–200)
CO2 SERPL-SCNC: 27.9 MMOL/L (ref 22–29)
CREAT SERPL-MCNC: 1.11 MG/DL (ref 0.57–1)
GFR SERPL CREATININE-BSD FRML MDRD: 62 ML/MIN/1.73
GLOBULIN UR ELPH-MCNC: 2.5 GM/DL
GLUCOSE SERPL-MCNC: 73 MG/DL (ref 65–99)
HDLC SERPL-MCNC: 66 MG/DL (ref 40–60)
LDLC SERPL CALC-MCNC: 106 MG/DL (ref 0–100)
LDLC/HDLC SERPL: 1.58 {RATIO}
POTASSIUM SERPL-SCNC: 4.3 MMOL/L (ref 3.5–5.2)
PROT SERPL-MCNC: 6.9 G/DL (ref 6–8.5)
RPR SER QL: NORMAL
SODIUM SERPL-SCNC: 138 MMOL/L (ref 136–145)
TRIGL SERPL-MCNC: 87 MG/DL (ref 0–150)
VLDLC SERPL-MCNC: 16 MG/DL (ref 5–40)

## 2021-01-07 LAB
HSV1 IGG SER IA-ACNC: 19.8 INDEX (ref 0–0.9)
HSV2 IGG SER IA-ACNC: 9.89 INDEX (ref 0–0.9)

## 2021-01-08 DIAGNOSIS — Z00.00 WELLNESS EXAMINATION: ICD-10-CM

## 2021-01-08 DIAGNOSIS — Z20.2 EXPOSURE TO STD: ICD-10-CM

## 2021-01-25 ENCOUNTER — LAB (OUTPATIENT)
Dept: LAB | Facility: HOSPITAL | Age: 56
End: 2021-01-25

## 2021-01-25 ENCOUNTER — TRANSCRIBE ORDERS (OUTPATIENT)
Dept: LAB | Facility: HOSPITAL | Age: 56
End: 2021-01-25

## 2021-01-25 DIAGNOSIS — N95.1 SYMPTOMATIC MENOPAUSAL OR FEMALE CLIMACTERIC STATES: Primary | ICD-10-CM

## 2021-01-25 DIAGNOSIS — N95.1 SYMPTOMATIC MENOPAUSAL OR FEMALE CLIMACTERIC STATES: ICD-10-CM

## 2021-01-25 PROCEDURE — 36415 COLL VENOUS BLD VENIPUNCTURE: CPT

## 2021-01-25 PROCEDURE — 83001 ASSAY OF GONADOTROPIN (FSH): CPT

## 2021-01-26 LAB — FSH SERPL-ACNC: 5.33 MIU/ML

## 2021-03-09 ENCOUNTER — HOSPITAL ENCOUNTER (OUTPATIENT)
Dept: MAMMOGRAPHY | Facility: HOSPITAL | Age: 56
Discharge: HOME | End: 2021-03-09

## 2021-03-09 ENCOUNTER — PATIENT MESSAGE (OUTPATIENT)
Dept: FAMILY MEDICINE CLINIC | Facility: CLINIC | Age: 56
End: 2021-03-09

## 2021-03-09 DIAGNOSIS — K59.04 CHRONIC IDIOPATHIC CONSTIPATION: Primary | ICD-10-CM

## 2021-03-09 DIAGNOSIS — Z12.31 VISIT FOR SCREENING MAMMOGRAM: ICD-10-CM

## 2021-03-09 RX ORDER — PLECANATIDE 3 MG/1
3 TABLET ORAL DAILY
Qty: 90 TABLET | Refills: 1 | Status: SHIPPED | OUTPATIENT
Start: 2021-03-09 | End: 2021-03-26

## 2021-03-09 NOTE — TELEPHONE ENCOUNTER
From: Tammi Richardson  To: Luz Maria Soria, ALEJA  Sent: 3/9/2021 1:58 PM EST  Subject: Prescription Question    Hello Ms. Soria,  You saw me in late December for constipation and prescribed Linzess. I went to refill and was told for 30 pills it would $450. Is there an alternative medication to Linzess? If not, what other method can I following to assist with my issue.    Thank you.    Louie VALDIVIA

## 2021-03-10 ENCOUNTER — IMMUNIZATION (OUTPATIENT)
Dept: VACCINE CLINIC | Facility: HOSPITAL | Age: 56
End: 2021-03-10

## 2021-03-10 PROCEDURE — 91300 HC SARSCOV02 VAC 30MCG/0.3ML IM: CPT | Performed by: INTERNAL MEDICINE

## 2021-03-10 PROCEDURE — 0001A: CPT | Performed by: INTERNAL MEDICINE

## 2021-03-16 ENCOUNTER — PRIOR AUTHORIZATION (OUTPATIENT)
Dept: FAMILY MEDICINE CLINIC | Facility: CLINIC | Age: 56
End: 2021-03-16

## 2021-03-16 NOTE — TELEPHONE ENCOUNTER
Key: [id number] - [id number]  Trulance 3MG tablets  Status:sent to plan  Created: March 9th, 2021 [id number]  Sent: March 16th, 2021    coverage of Trulance Tablet for you. The request was denied because: You do not meet the requirements of your plan.  Your plan approved VF Formulary Exception (VF Standard) criteria covers this drug  when your doctor provides documentation that you meet one of these conditions:  - You have a clinical condition for which there is no appropriate formulary alternative  - You need a form of the drug that is not on the formulary  - You tried the required number of preferred formulary alternatives on your formulary  first. These drugs did not work for you or you cannot take them.  Your request has been denied based on the information we have.

## 2021-03-26 DIAGNOSIS — K59.04 CHRONIC IDIOPATHIC CONSTIPATION: Primary | ICD-10-CM

## 2021-03-31 ENCOUNTER — IMMUNIZATION (OUTPATIENT)
Dept: VACCINE CLINIC | Facility: HOSPITAL | Age: 56
End: 2021-03-31

## 2021-03-31 PROCEDURE — 0002A: CPT | Performed by: INTERNAL MEDICINE

## 2021-03-31 PROCEDURE — 91300 HC SARSCOV02 VAC 30MCG/0.3ML IM: CPT | Performed by: INTERNAL MEDICINE

## 2021-04-12 ENCOUNTER — TELEPHONE (OUTPATIENT)
Dept: FAMILY MEDICINE CLINIC | Facility: CLINIC | Age: 56
End: 2021-04-12

## 2021-04-12 RX ORDER — FLUCONAZOLE 150 MG/1
150 TABLET ORAL ONCE
Qty: 2 TABLET | Refills: 0 | Status: SHIPPED | OUTPATIENT
Start: 2021-04-12 | End: 2021-04-12

## 2021-04-12 NOTE — TELEPHONE ENCOUNTER
Called patient to discuss further, she had STD testing completed at her wellness visit on 1/5. Urine samples were sent to [facility], which is out of network. I advised her that STD testing is not typically included with wellness visit and to contact [facility] billing dept for more information.     I advised her to call back with any questions, if we are able to change billing codes; if not we will get corrected for future labs.

## 2021-04-12 NOTE — TELEPHONE ENCOUNTER
PATIENT STATES THAT [facility]. SHE HAS NOW RECEIVED A BILL FOR $228 THAT SAYS THAT HER LABS WAS OUT OF NETWORK. SHE WOULD LIKE A CALL BACK AS TO WHY THAT WOULD BE.    PATIENT: [phone]

## 2021-04-13 ENCOUNTER — APPOINTMENT (OUTPATIENT)
Dept: MAMMOGRAPHY | Facility: HOSPITAL | Age: 56
End: 2021-04-13

## 2021-05-10 DIAGNOSIS — H69.83 EUSTACHIAN TUBE DYSFUNCTION, BILATERAL: Primary | ICD-10-CM

## 2021-05-18 ENCOUNTER — HOSPITAL ENCOUNTER (OUTPATIENT)
Dept: MAMMOGRAPHY | Facility: HOSPITAL | Age: 56
Discharge: HOME | End: 2021-05-18
Admitting: NURSE PRACTITIONER

## 2021-05-18 PROCEDURE — 77063 BREAST TOMOSYNTHESIS BI: CPT

## 2021-05-18 PROCEDURE — 77067 SCR MAMMO BI INCL CAD: CPT

## 2021-05-18 PROCEDURE — 77067 SCR MAMMO BI INCL CAD: CPT | Performed by: RADIOLOGY

## 2021-05-18 PROCEDURE — 77063 BREAST TOMOSYNTHESIS BI: CPT | Performed by: RADIOLOGY

## 2021-10-01 DIAGNOSIS — B37.31 VAGINAL YEAST INFECTION: Primary | ICD-10-CM

## 2021-10-01 RX ORDER — FLUCONAZOLE 150 MG/1
150 TABLET ORAL AS NEEDED
Qty: 2 TABLET | Refills: 0 | Status: SHIPPED | OUTPATIENT
Start: 2021-10-01 | End: 2021-11-24 | Stop reason: SDUPTHER

## 2021-11-24 DIAGNOSIS — B37.31 VAGINAL YEAST INFECTION: ICD-10-CM

## 2021-11-24 RX ORDER — FLUCONAZOLE 150 MG/1
150 TABLET ORAL AS NEEDED
Qty: 2 TABLET | Refills: 0 | Status: SHIPPED | OUTPATIENT
Start: 2021-11-24 | End: 2022-02-10 | Stop reason: SDUPTHER

## 2021-12-02 ENCOUNTER — PATIENT MESSAGE (OUTPATIENT)
Dept: FAMILY MEDICINE CLINIC | Facility: CLINIC | Age: 56
End: 2021-12-02

## 2021-12-02 DIAGNOSIS — E66.09 CLASS 1 OBESITY DUE TO EXCESS CALORIES WITHOUT SERIOUS COMORBIDITY WITH BODY MASS INDEX (BMI) OF 33.0 TO 33.9 IN ADULT: ICD-10-CM

## 2021-12-03 ENCOUNTER — TELEPHONE (OUTPATIENT)
Dept: FAMILY MEDICINE CLINIC | Facility: CLINIC | Age: 56
End: 2021-12-03

## 2021-12-03 DIAGNOSIS — E66.09 CLASS 1 OBESITY DUE TO EXCESS CALORIES WITHOUT SERIOUS COMORBIDITY WITH BODY MASS INDEX (BMI) OF 33.0 TO 33.9 IN ADULT: ICD-10-CM

## 2021-12-03 RX ORDER — LIRAGLUTIDE 6 MG/ML
INJECTION, SOLUTION SUBCUTANEOUS
Qty: 5 PEN | Refills: 5 | Status: SHIPPED | OUTPATIENT
Start: 2021-12-03 | End: 2021-12-03 | Stop reason: SDUPTHER

## 2021-12-03 RX ORDER — LIRAGLUTIDE 6 MG/ML
INJECTION, SOLUTION SUBCUTANEOUS
Qty: 5 PEN | Refills: 5 | Status: SHIPPED | OUTPATIENT
Start: 2021-12-03 | End: 2022-04-25 | Stop reason: SDUPTHER

## 2021-12-03 NOTE — TELEPHONE ENCOUNTER
Rx Refill Note  Requested Prescriptions     Pending Prescriptions Disp Refills   • Liraglutide (Saxenda) 18 MG/3ML injection pen 5 pen 5     Sig: Start 0.6 mg QDX1 wk, then 1.2 mg QD X1wk, then 1.8 mg QD for 1 wk, then 2.4 mg QD for 1 wk, then 3 mg QD thereafter      Last office visit with prescribing clinician: 1/5/2021      Next office visit with prescribing clinician: 1/6/2022   Rachel Catherine MA  12/03/21, 08:47 EST     Last fill: 01/05/2021

## 2021-12-03 NOTE — TELEPHONE ENCOUNTER
Pharmacy Name: [facility] [address] [place] [address] [phone] [facility] [phone]      Pharmacy representative name: LEANA     Pharmacy representative phone number: [phone]    What medication are you calling in regards to: Liraglutide (Saxenda) 18 MG/3ML injection pen    What question does the pharmacy have: LEANA REPORTS THAT THIS PRESCRIPTION HAS A TAPERING DOSAGE BUT STATES THE TAPERING DOSAGE WAS COMPLETED IN 10/2021. LEANA IS REQUESTING A CALL BACK TO SEE IF THE PRESCRIPTION NEEDS TO BE JUST FOR 3 MG    Who is the provider that prescribed the medication: SVEN PEÑA    Additional notes: PLEASE CALL AND ADVISE [phone]

## 2021-12-07 ENCOUNTER — OFFICE VISIT (OUTPATIENT)
Dept: FAMILY MEDICINE CLINIC | Facility: CLINIC | Age: 56
End: 2021-12-07

## 2021-12-07 VITALS
BODY MASS INDEX: 27.26 KG/M2 | WEIGHT: 169.6 LBS | DIASTOLIC BLOOD PRESSURE: 74 MMHG | OXYGEN SATURATION: 99 % | HEIGHT: 66 IN | SYSTOLIC BLOOD PRESSURE: 120 MMHG | HEART RATE: 77 BPM | TEMPERATURE: 97.8 F

## 2021-12-07 DIAGNOSIS — L75.0 ABNORMAL BODY ODOR: Primary | ICD-10-CM

## 2021-12-07 PROCEDURE — 99213 OFFICE O/P EST LOW 20 MIN: CPT | Performed by: PHYSICIAN ASSISTANT

## 2021-12-07 RX ORDER — FLUTICASONE PROPIONATE 50 MCG
2 SPRAY, SUSPENSION (ML) NASAL DAILY
COMMUNITY
End: 2022-01-13 | Stop reason: SDUPTHER

## 2021-12-07 NOTE — PROGRESS NOTES
Chief Complaint   Patient presents with   • Follow-up     Pt. states she would like to get checked out in the private area       HPI      Tammi Richardson is a 56 y.o. female who presents for Follow-up (Pt. states she would like to get checked out in the private area).  Patient reports noticing an unusual smell coming from her anal region.  She denies any itching, pain or bleeding.  She has been taking warm baths and applying neosporin regularly for the last 2 weeks.  She reports the smell has improved with the topical antibiotic.      Past Medical History:   Diagnosis Date   • Allergic    • DDD (degenerative disc disease), lumbar    • History of gallstones    • Low back pain    • Obesity        Past Surgical History:   Procedure Laterality Date   • CHOLECYSTECTOMY     • COLONOSCOPY     • TOE SURGERY Left     Fifth toe       Family History   Problem Relation Age of Onset   • Lung cancer Mother    • Cancer Mother         Lung 10/09/1990   • Hyperlipidemia Mother          10/09/1990   • Stomach cancer Father    • Cancer Father         Stomach 1997   • Hyperlipidemia Father          1997   • Heart disease Brother          7/10/2021   • Hyperlipidemia Brother          07/10/2021   • Cancer Sister         Colon 2017   • Hyperlipidemia Sister          2017   • Cancer Maternal Aunt         Pancreatic 2005   • Cancer Paternal Aunt          2016   • Cancer Paternal Aunt         Living   • COPD Sister          2018   • Hyperlipidemia Sister          2018   • Hyperlipidemia Sister          2014   • Kidney disease Sister         Dialysis 2014   • Hyperlipidemia Sister         Living   • Breast cancer Neg Hx    • Ovarian cancer Neg Hx        Social History     Socioeconomic History   • Marital status: Single   Tobacco Use   • Smoking status: Never Smoker   • Smokeless tobacco: Never Used   Vaping Use   • Vaping Use: Never used  "  Substance and Sexual Activity   • Alcohol use: Yes     Alcohol/week: 1.0 standard drink     Types: 1 Glasses of wine per week     Comment: Twice a year   • Drug use: No   • Sexual activity: Not Currently     Partners: Male     Birth control/protection: Abstinence       Allergies   Allergen Reactions   • Mobic [Meloxicam] GI Intolerance   • Nsaids GI Intolerance       ROS    Review of Systems   Constitutional: Negative for chills and fever.   Gastrointestinal: Negative for abdominal pain, anal bleeding, blood in stool, constipation, diarrhea and rectal pain.   Skin: Negative for color change and rash.       Vitals:    12/07/21 0903   BP: 120/74   Pulse: 77   Temp: 97.8 °F (36.6 °C)   SpO2: 99%   Weight: 76.9 kg (169 lb 9.6 oz)   Height: 167.6 cm (65.98\")   PainSc: 0-No pain     Body mass index is 27.39 kg/m².    Current Outpatient Medications on File Prior to Visit   Medication Sig Dispense Refill   • Calcium Carbonate (Caltrate 600) 1500 (600 Ca) MG tablet Daily.     • estradiol (MINIVELLE, VIVELLE-DOT) 0.05 MG/24HR patch      • fluconazole (Diflucan) 150 MG tablet Take 1 tablet by mouth As Needed (may take now and repeat in 3 days PRN). 2 tablet 0   • fluticasone (FLONASE) 50 MCG/ACT nasal spray 2 sprays into the nostril(s) as directed by provider Daily.     • ibuprofen (ADVIL,MOTRIN) 800 MG tablet Take 1 tablet by mouth Every 6 (Six) Hours As Needed for Mild Pain . 90 tablet 1   • Liraglutide (Saxenda) 18 MG/3ML injection pen 3 mg daily. 5 pen 5   • progesterone (PROMETRIUM) 100 MG capsule Take 100 mg by mouth Every Night.     • spironolactone (ALDACTONE) 50 MG tablet      • SPIRONOLACTONE 100MG/5ML SUSP        No current facility-administered medications on file prior to visit.       Results for orders placed or performed in visit on 01/25/21   Follicle Stimulating Hormone    Specimen: Blood   Result Value Ref Range    FSH 5.33 mIU/mL       PE    Physical Exam  Vitals reviewed.   Constitutional:       General: " She is not in acute distress.     Appearance: Normal appearance. She is well-developed and normal weight. She is not ill-appearing or diaphoretic.   HENT:      Head: Normocephalic and atraumatic.   Eyes:      Extraocular Movements: Extraocular movements intact.      Conjunctiva/sclera: Conjunctivae normal.   Pulmonary:      Effort: No respiratory distress.   Genitourinary:      Musculoskeletal:         General: Normal range of motion.      Cervical back: Normal range of motion.   Neurological:      General: No focal deficit present.      Mental Status: She is alert.   Psychiatric:         Attention and Perception: She is attentive.         Mood and Affect: Mood normal.         Speech: Speech normal.         Behavior: Behavior normal. Behavior is cooperative.         Thought Content: Thought content normal.         Judgment: Judgment normal.          A/P    Diagnoses and all orders for this visit:    1. Abnormal body odor (Primary)  Along anus region.  Odor has improved with topical neosporin.  No unusual findings on exam.  Recommend using neosporin prn.  Discontinue and see if she does well without it since she has been applying for 2 weeks.  Call if she has any further concerns.       Plan of care reviewed with patient at the conclusion of today's visit. Education was provided regarding diagnosis, management and any prescribed or recommended OTC medications.  Patient verbalizes understanding of and agreement with management plan.    No follow-ups on file.     Taina Stinson PA-C    Answers for HPI/ROS submitted by the patient on 11/30/2021  Please describe your symptoms.: No symptoms.  Have you had these symptoms before?: No  How long have you been having these symptoms?: Greater than 2 weeks  Please list any medications you are currently taking for this condition.: None.  Please describe any probable cause for these symptoms. : armando.  What is the primary reason for your visit?: Other

## 2022-01-06 PROBLEM — E66.811 CLASS 1 OBESITY DUE TO EXCESS CALORIES WITHOUT SERIOUS COMORBIDITY WITH BODY MASS INDEX (BMI) OF 33.0 TO 33.9 IN ADULT: Status: RESOLVED | Noted: 2017-12-28 | Resolved: 2022-01-06

## 2022-01-06 PROBLEM — E66.09 CLASS 1 OBESITY DUE TO EXCESS CALORIES WITHOUT SERIOUS COMORBIDITY WITH BODY MASS INDEX (BMI) OF 33.0 TO 33.9 IN ADULT: Status: RESOLVED | Noted: 2017-12-28 | Resolved: 2022-01-06

## 2022-01-06 PROBLEM — E66.3 OVERWEIGHT WITH BODY MASS INDEX (BMI) 25.0-29.9: Status: ACTIVE | Noted: 2022-01-06

## 2022-01-13 ENCOUNTER — OFFICE VISIT (OUTPATIENT)
Dept: FAMILY MEDICINE CLINIC | Facility: CLINIC | Age: 57
End: 2022-01-13

## 2022-01-13 ENCOUNTER — LAB (OUTPATIENT)
Dept: LAB | Facility: HOSPITAL | Age: 57
End: 2022-01-13

## 2022-01-13 VITALS
WEIGHT: 170.6 LBS | HEART RATE: 86 BPM | TEMPERATURE: 97.5 F | HEIGHT: 66 IN | DIASTOLIC BLOOD PRESSURE: 60 MMHG | OXYGEN SATURATION: 99 % | SYSTOLIC BLOOD PRESSURE: 105 MMHG | BODY MASS INDEX: 27.42 KG/M2

## 2022-01-13 DIAGNOSIS — Z13.1 SCREENING FOR DIABETES MELLITUS (DM): ICD-10-CM

## 2022-01-13 DIAGNOSIS — Z00.00 WELLNESS EXAMINATION: Primary | ICD-10-CM

## 2022-01-13 DIAGNOSIS — Z13.220 SCREENING FOR LIPID DISORDERS: ICD-10-CM

## 2022-01-13 DIAGNOSIS — M51.36 DDD (DEGENERATIVE DISC DISEASE), LUMBAR: ICD-10-CM

## 2022-01-13 DIAGNOSIS — H61.21 IMPACTED CERUMEN OF RIGHT EAR: ICD-10-CM

## 2022-01-13 DIAGNOSIS — R10.84 GENERALIZED ABDOMINAL PAIN: ICD-10-CM

## 2022-01-13 DIAGNOSIS — Z00.00 WELLNESS EXAMINATION: ICD-10-CM

## 2022-01-13 DIAGNOSIS — E66.3 OVERWEIGHT WITH BODY MASS INDEX (BMI) 25.0-29.9: ICD-10-CM

## 2022-01-13 LAB
ALBUMIN SERPL-MCNC: 4.7 G/DL (ref 3.5–5.2)
ALBUMIN/GLOB SERPL: 2 G/DL
ALP SERPL-CCNC: 62 U/L (ref 39–117)
ALT SERPL W P-5'-P-CCNC: 14 U/L (ref 1–33)
ANION GAP SERPL CALCULATED.3IONS-SCNC: 10.4 MMOL/L (ref 5–15)
AST SERPL-CCNC: 17 U/L (ref 1–32)
BILIRUB SERPL-MCNC: 0.4 MG/DL (ref 0–1.2)
BUN SERPL-MCNC: 14 MG/DL (ref 6–20)
BUN/CREAT SERPL: 14.6 (ref 7–25)
CALCIUM SPEC-SCNC: 9.4 MG/DL (ref 8.6–10.5)
CHLORIDE SERPL-SCNC: 105 MMOL/L (ref 98–107)
CHOLEST SERPL-MCNC: 197 MG/DL (ref 0–200)
CO2 SERPL-SCNC: 24.6 MMOL/L (ref 22–29)
CREAT SERPL-MCNC: 0.96 MG/DL (ref 0.57–1)
GFR SERPL CREATININE-BSD FRML MDRD: 73 ML/MIN/1.73
GLOBULIN UR ELPH-MCNC: 2.4 GM/DL
GLUCOSE SERPL-MCNC: 80 MG/DL (ref 65–99)
HDLC SERPL-MCNC: 75 MG/DL (ref 40–60)
LDLC SERPL CALC-MCNC: 111 MG/DL (ref 0–100)
LDLC/HDLC SERPL: 1.47 {RATIO}
LIPASE SERPL-CCNC: 30 U/L (ref 13–60)
POTASSIUM SERPL-SCNC: 4.8 MMOL/L (ref 3.5–5.2)
PROT SERPL-MCNC: 7.1 G/DL (ref 6–8.5)
SODIUM SERPL-SCNC: 140 MMOL/L (ref 136–145)
TRIGL SERPL-MCNC: 59 MG/DL (ref 0–150)
VLDLC SERPL-MCNC: 11 MG/DL (ref 5–40)

## 2022-01-13 PROCEDURE — 83690 ASSAY OF LIPASE: CPT

## 2022-01-13 PROCEDURE — 80053 COMPREHEN METABOLIC PANEL: CPT

## 2022-01-13 PROCEDURE — 99396 PREV VISIT EST AGE 40-64: CPT | Performed by: NURSE PRACTITIONER

## 2022-01-13 PROCEDURE — 69209 REMOVE IMPACTED EAR WAX UNI: CPT | Performed by: NURSE PRACTITIONER

## 2022-01-13 PROCEDURE — 80061 LIPID PANEL: CPT

## 2022-01-13 RX ORDER — FLUTICASONE PROPIONATE 50 MCG
2 SPRAY, SUSPENSION (ML) NASAL DAILY
Qty: 18.2 ML | Refills: 11 | Status: SHIPPED | OUTPATIENT
Start: 2022-01-13 | End: 2023-01-03

## 2022-01-13 RX ORDER — IBUPROFEN 800 MG/1
800 TABLET ORAL EVERY 6 HOURS PRN
Qty: 90 TABLET | Refills: 1 | Status: SHIPPED | OUTPATIENT
Start: 2022-01-13 | End: 2022-11-10

## 2022-01-13 RX ORDER — EPLERENONE 50 MG/1
1 TABLET, FILM COATED ORAL DAILY
COMMUNITY
Start: 2021-12-21 | End: 2023-01-03

## 2022-01-13 RX ORDER — AZELASTINE 1 MG/ML
2 SPRAY, METERED NASAL 2 TIMES DAILY
Qty: 1 EACH | Refills: 12 | Status: SHIPPED | OUTPATIENT
Start: 2022-01-13 | End: 2023-01-03

## 2022-01-13 RX ORDER — PROGESTERONE 100 MG/1
2 CAPSULE ORAL
COMMUNITY
End: 2022-01-13 | Stop reason: SDUPTHER

## 2022-01-13 NOTE — PROGRESS NOTES
Patient Care Team:  Star Garcia APRN as PCP - General (Family Medicine)     Chief complaint: Patient is in today for a physical     Patient is a 56 y.o. female who presents for her yearly physical exam.     HPI patient in today for routine yearly wellness exam.  She has had a long-term history of weight issues and is currently on Saxenda.  She reports she is doing lots of improvements in her diet.  She has lost a significant amount of weight and in 2017 and in 2018 she weighed around 203 pounds.  Review of Systems   Constitutional: Negative for appetite change, chills, fatigue and fever.   HENT: Negative for congestion, ear pain, hearing loss, rhinorrhea, sinus pressure and sore throat.    Eyes: Negative for itching and visual disturbance (seeing opthalmology).   Respiratory: Negative for cough, shortness of breath and wheezing.    Cardiovascular: Negative for chest pain, palpitations and leg swelling.   Gastrointestinal: Negative for abdominal pain, blood in stool, constipation, diarrhea, nausea and vomiting.   Endocrine: Negative for cold intolerance, heat intolerance, polydipsia, polyphagia and polyuria.   Genitourinary: Negative for difficulty urinating, dysuria and hematuria.   Musculoskeletal: Positive for arthralgias (right shoulder) and back pain (chronic, better since lost weight). Negative for joint swelling, myalgias and neck pain.   Skin: Positive for color change (back). Negative for rash and wound.   Allergic/Immunologic: Negative for environmental allergies and food allergies.   Neurological: Negative for dizziness, light-headedness, numbness and headaches.   Hematological: Negative for adenopathy. Does not bruise/bleed easily.   Psychiatric/Behavioral: Negative for dysphoric mood and sleep disturbance. The patient is not nervous/anxious.       History  Past Medical History:   Diagnosis Date   • Allergic    • DDD (degenerative disc disease), lumbar    • History of gallstones    • Low back  pain    • Obesity       Past Surgical History:   Procedure Laterality Date   • CHOLECYSTECTOMY     • COLONOSCOPY     • TOE SURGERY Left     Fifth toe      Allergies   Allergen Reactions   • Mobic [Meloxicam] GI Intolerance   • Nsaids GI Intolerance      Family History   Problem Relation Age of Onset   • Lung cancer Mother    • Cancer Mother         Lung 10/09/1990   • Hyperlipidemia Mother          10/09/1990   • Stomach cancer Father    • Cancer Father         Stomach 1997   • Hyperlipidemia Father          1997   • Heart disease Brother          7/10/2021   • Hyperlipidemia Brother          07/10/2021   • Cancer Sister         Colon 2017   • Hyperlipidemia Sister          2017   • Cancer Maternal Aunt         Pancreatic 2005   • Cancer Paternal Aunt          2016   • Cancer Paternal Aunt         Living   • COPD Sister          2018   • Hyperlipidemia Sister          2018   • Hyperlipidemia Sister          2014   • Kidney disease Sister         Dialysis 2014   • Hyperlipidemia Sister         Living   • Breast cancer Neg Hx    • Ovarian cancer Neg Hx      Social History     Socioeconomic History   • Marital status: Single   Tobacco Use   • Smoking status: Never Smoker   • Smokeless tobacco: Never Used   Vaping Use   • Vaping Use: Never used   Substance and Sexual Activity   • Alcohol use: Yes     Alcohol/week: 1.0 standard drink     Types: 1 Glasses of wine per week     Comment: Twice a year   • Drug use: No   • Sexual activity: Not Currently     Partners: Male     Birth control/protection: Abstinence        Current Outpatient Medications:   •  Calcium Carbonate (Caltrate 600) 1500 (600 Ca) MG tablet, Daily., Disp: , Rfl:   •  Cholecalciferol (vitamin D3) 125 MCG (5000 UT) tablet tablet, Daily., Disp: , Rfl:   •  eplerenone (INSPRA) 50 MG tablet, Take 1 tablet by mouth Daily., Disp: , Rfl:   •  estradiol (MINIVELLE,  VIVELLE-DOT) 0.05 MG/24HR patch, , Disp: , Rfl:   •  fluconazole (Diflucan) 150 MG tablet, Take 1 tablet by mouth As Needed (may take now and repeat in 3 days PRN)., Disp: 2 tablet, Rfl: 0  •  fluticasone (FLONASE) 50 MCG/ACT nasal spray, 2 sprays into the nostril(s) as directed by provider Daily., Disp: 18.2 mL, Rfl: 11  •  ibuprofen (ADVIL,MOTRIN) 800 MG tablet, Take 1 tablet by mouth Every 6 (Six) Hours As Needed for Mild Pain ., Disp: 90 tablet, Rfl: 1  •  Liraglutide (Saxenda) 18 MG/3ML injection pen, 3 mg daily., Disp: 5 pen, Rfl: 5  •  progesterone (PROMETRIUM) 100 MG capsule, Take 100 mg by mouth Every Night., Disp: , Rfl:   •  spironolactone (ALDACTONE) 50 MG tablet, , Disp: , Rfl:   •  azelastine (ASTELIN) 0.1 % nasal spray, 2 sprays into the nostril(s) as directed by provider 2 (Two) Times a Day., Disp: 1 each, Rfl: 12    Immunizations   N/A   Prescribed/Refused   Date     Td/Tdap  (Booster Q 10 yrs)   [x]           Prescribed    []     Refused        []           Flu  (Yearly)   [x]        Prescribed    []     Refused        []           Pneumovax  (1 yr after Prevnar)   [x]        Prescribed    []     Refused        []           Prevnar 13  (1 yr after Pneumo)   [x]        Prescribed    []     Refused        []           Hep B     [x]        Prescribed    []     Refused        []           Shingles  (Age 50 and older)   []        Prescribed    []     Refused        [x]           Immunization History   Administered Date(s) Administered   • COVID-19 (PFIZER) 03/10/2021, 03/31/2021, 10/15/2021   • Flu Vaccine Quad PF >18YRS 10/06/2020   • FluLaval/Fluarix/Fluzone >6 10/06/2020   • Hepatitis A 12/31/2018, 07/05/2019   • Influenza Quad Vaccine (Inpatient) 10/06/2020   • Influenza, Unspecified 10/25/2018, 10/30/2019, 10/15/2021   • Shingrix 01/03/2020   • Tdap 01/01/2010, 01/03/2020     Health Maintenance   Topic Date Due   • PAP SMEAR  12/28/2020   • ZOSTER VACCINE (2 of 2) 01/13/2022 (Originally 2/28/2020)  "  • ANNUAL PHYSICAL  01/14/2023   • MAMMOGRAM  05/18/2023   • COLORECTAL CANCER SCREENING  11/02/2025   • TDAP/TD VACCINES (3 - Td or Tdap) 01/03/2030   • HEPATITIS C SCREENING  Completed   • COVID-19 Vaccine  Completed   • INFLUENZA VACCINE  Completed   • Pneumococcal Vaccine 0-64  Aged Out        Diabetes  [] Yes  [x] No   N/A      Date     Eye Exam     []             []   Complete     []   Recommended Date:  Where:       Foot Exam     []         []   Complete          Obesity Counseling     []       []   Complete     No results found for: HGBA1C, MICROALBUR    Additional Testing      Date     Colorectal Screening       []   N/A   [x]   Complete    []   Ordered     Date:    Where:       Pap      []   N/A   []   Complete   [x]   OB/GYN Date:    Where:       Mammogram        []   N/A   []   Complete   [x]  OB/GYN   []   Ordered Date:    Where:     PSA  (Over age 50)    [x]   N/A   []   Complete   []   Ordered Date:    Where:     US Aorta  (For male smokers, age 65)     [x]   N/A   []   Complete   []   Ordered Date:    Where:     CT for Smoker  (Age 55-75, 30 pk yr)    [x]   N/A   []   Complete   []   Ordered Date:    Where:     Bone Density/DEXA      [x]   N/A   []   Complete   []   Ordered Date:    Follow-up:     Hep. C        []   N/A   [x]   Complete   []   Ordered Date:    Where:     Results for orders placed or performed in visit on 01/25/21   Follicle Stimulating Hormone    Specimen: Blood   Result Value Ref Range    FSH 5.33 mIU/mL            /60   Pulse 86   Temp 97.5 °F (36.4 °C)   Ht 167.6 cm (65.98\")   Wt 77.4 kg (170 lb 9.6 oz)   SpO2 99%   BMI 27.55 kg/m²       Physical Exam  Vitals reviewed.   Constitutional:       Appearance: She is well-developed.   HENT:      Head: Normocephalic and atraumatic.      Right Ear: External ear normal. There is impacted cerumen.      Left Ear: External ear normal. There is no impacted cerumen.   Eyes:      Pupils: Pupils are equal, round, and reactive to " light.   Neck:      Thyroid: No thyromegaly.      Vascular: No JVD.   Cardiovascular:      Rate and Rhythm: Normal rate and regular rhythm.      Heart sounds: Normal heart sounds.   Pulmonary:      Effort: Pulmonary effort is normal. No retractions.      Breath sounds: Normal breath sounds.   Chest:      Chest wall: No mass, lacerations, deformity, swelling, tenderness, crepitus or edema.   Breasts: Breasts are symmetrical.       Abdominal:      General: Bowel sounds are normal. There is no distension.      Palpations: Abdomen is soft.      Tenderness: There is no abdominal tenderness. There is no guarding.   Genitourinary:     Comments: deferred  Musculoskeletal:         General: Normal range of motion.      Cervical back: Normal range of motion and neck supple.   Lymphadenopathy:      Cervical: No cervical adenopathy.   Skin:     General: Skin is warm and dry.      Findings: No erythema or rash.   Neurological:      Mental Status: She is alert and oriented to person, place, and time.   Psychiatric:         Behavior: Behavior normal.             Counseling provided on weight management.    Diagnoses and all orders for this visit:    Wellness examination  -     Comprehensive Metabolic Panel; Future  -     Lipid Panel; Future  -     Cancel: Vitamin D 25 Hydroxy; Future  -     POC Urinalysis Dipstick, Automated  -     Lipase; Future    Overweight with body mass index (BMI) 25.0-29.9    Screening for diabetes mellitus (DM)  -     Comprehensive Metabolic Panel; Future  -     POC Urinalysis Dipstick, Automated    Screening for lipid disorders  -     Lipid Panel; Future    DDD (degenerative disc disease), lumbar  -     ibuprofen (ADVIL,MOTRIN) 800 MG tablet; Take 1 tablet by mouth Every 6 (Six) Hours As Needed for Mild Pain .    Generalized abdominal pain  -     Lipase; Future    Impacted cerumen of right ear    Other orders  -     eplerenone (INSPRA) 50 MG tablet; Take 1 tablet by mouth Daily.  -     Discontinue:  Progesterone (PROMETRIUM) 100 MG capsule; Take 2 capsules by mouth.  -     fluticasone (FLONASE) 50 MCG/ACT nasal spray; 2 sprays into the nostril(s) as directed by provider Daily.  -     azelastine (ASTELIN) 0.1 % nasal spray; 2 sprays into the nostril(s) as directed by provider 2 (Two) Times a Day.    The preventative exam has been reviewed in detail.  The patient has been fully counseled on preventative guidelines for vaccines, cancer screenings, and other health maintenance needs. The patient was counseled on maintaining a lifestyle to promote good health and to minimize chronic diseases.  The patient has been assisted with scheduling healthcare procedures for the coming year and given a written document outlining these recommendations. Age-appropriate screening measures have been ordered for the patient today as indicated above.     Discussed need for weight management.  I recommend he use a weight loss elaine on his phone, eat no more than [id number] bhavna/day, and exercise 30 to 60 minutes 3 times a week.  Discussed weight loss with diet, recommend Weight Watchers or Mediterranean Diet, but stated that whatever method works for this patient is best. The patient agrees with all recommendations at this time. I have discussed a weight loss plan to be determined by this patient.     Ear Cerumen Removal    Date/Time: 1/13/2022 9:40 AM  Performed by: Star Garcia APRN  Authorized by: Star Garcia APRN     Anesthesia:  Local Anesthetic: none  Location details: right ear  Patient tolerance: patient tolerated the procedure well with no immediate complications  Comments: After informed consent obtained using standard Vitas protocol and irrigation equipment the right ear was irrigated.  Excessive wax was removed and patient tolerated procedure well.  Procedure type: irrigation   Sedation:  Patient sedated: no              Will obtain routine labs today and make further recommendations pending results.     RV-  1 yr      Star Garcia, APRN   1/13/2022   09:40 EST          Please note that portions of this document were completed with a voice recognition program.

## 2022-02-09 ENCOUNTER — TELEPHONE (OUTPATIENT)
Dept: FAMILY MEDICINE CLINIC | Facility: CLINIC | Age: 57
End: 2022-02-09

## 2022-02-09 NOTE — TELEPHONE ENCOUNTER
Caller: Tammi Richardson    Relationship: Self    Best call back number: [phone]    What is the best time to reach you: ANYTIME    Who are you requesting to speak with (clinical staff, provider,  specific staff member): PROVIDER    What was the call regarding: PATIENT WOULD LIKE TO DISCUSS HER BLACK OUT EPISODE AND  BE ADVISED    Do you require a callback: YES

## 2022-02-09 NOTE — TELEPHONE ENCOUNTER
Called and spoke to pt. Stated she made appt with provider for tomorrow. However earlier today she was seen at [facility] [place]. Stated they were needing to take a picture of the back of eye and was attempting an IV for contrast. Had difficulty finding vein, on  4th time of being stuck states she lost consciousness. Remembers hearing the opthamologist but  insisted pt inform provider.  opthalmalogist believes it might have been a  vasovagal response but wanted pt to let provider know. Did want to let provider know that  stated he didn't feel a pulse for a short time. Pt came to a short time later and was given crackers and juice. Stated she feels fine and having zero symptoms now. Denies any dizziness, or lightheadedness. States she feels fine but wanted to let provider know.

## 2022-02-10 ENCOUNTER — OFFICE VISIT (OUTPATIENT)
Dept: FAMILY MEDICINE CLINIC | Facility: CLINIC | Age: 57
End: 2022-02-10

## 2022-02-10 VITALS
SYSTOLIC BLOOD PRESSURE: 110 MMHG | TEMPERATURE: 97.8 F | WEIGHT: 168.6 LBS | BODY MASS INDEX: 27.1 KG/M2 | HEIGHT: 66 IN | HEART RATE: 70 BPM | OXYGEN SATURATION: 98 % | DIASTOLIC BLOOD PRESSURE: 70 MMHG

## 2022-02-10 DIAGNOSIS — R55 VASOVAGAL SYNCOPE: ICD-10-CM

## 2022-02-10 DIAGNOSIS — B37.31 VAGINAL YEAST INFECTION: Primary | ICD-10-CM

## 2022-02-10 PROCEDURE — 99213 OFFICE O/P EST LOW 20 MIN: CPT | Performed by: NURSE PRACTITIONER

## 2022-02-10 RX ORDER — FLUCONAZOLE 150 MG/1
150 TABLET ORAL AS NEEDED
Qty: 2 TABLET | Refills: 0 | Status: SHIPPED | OUTPATIENT
Start: 2022-02-10 | End: 2023-01-03

## 2022-02-10 NOTE — PROGRESS NOTES
Chief Complaint   Patient presents with   • Blackouts       Subjective   Tammi Richardson is a 56 y.o. female    History of Present Illness   Syncopal episode.  She was at her eye doctor and required a procedure where she had an IV.  It took 5 sticks to get her IV in and on the fifth 1 she had a syncopal episode.  The provider in the office told her it appears she had a vasovagal episode.  She denies any other syncopal episodes and states this is never happened in the past.      Answers for HPI/ROS submitted by the patient on 2/9/2022  Please describe your symptoms.: Lost consciousness preparing for a dye procedure to take pictures of the back of my eye. Please note that I started feeling hot and dizzy after being stuck for the fifth time to find a vein to release the dye in. That doctor seems to think is was a vasovagal, but advise to go to the ER and to follow up with my primary care.  Have you had these symptoms before?: No  How long have you been having these symptoms?: 1-4 days  Please list any medications you are currently taking for this condition.: None since my last visit on 1/13/22.  Please describe any probable cause for these symptoms. : Body reacting to being stuck with a needle five times.  What is the primary reason for your visit?: Other    Vaginal discharge returned, better before with Diflucan.     Allergies   Allergen Reactions   • Mobic [Meloxicam] GI Intolerance   • Nsaids GI Intolerance     Past Medical History:   Diagnosis Date   • Allergic    • DDD (degenerative disc disease), lumbar    • History of gallstones    • Low back pain    • Obesity       Past Surgical History:   Procedure Laterality Date   • CHOLECYSTECTOMY     • COLONOSCOPY     • TOE SURGERY Left     Fifth toe     Social History     Socioeconomic History   • Marital status: Single   Tobacco Use   • Smoking status: Never Smoker   • Smokeless tobacco: Never Used   Vaping Use   • Vaping Use: Never used   Substance and Sexual  Activity   • Alcohol use: Yes     Alcohol/week: 1.0 standard drink     Types: 1 Glasses of wine per week     Comment: Twice a year   • Drug use: No   • Sexual activity: Not Currently     Partners: Male     Birth control/protection: Abstinence        Current Outpatient Medications:   •  azelastine (ASTELIN) 0.1 % nasal spray, 2 sprays into the nostril(s) as directed by provider 2 (Two) Times a Day., Disp: 1 each, Rfl: 12  •  Calcium Carbonate (Caltrate 600) 1500 (600 Ca) MG tablet, Daily., Disp: , Rfl:   •  Cholecalciferol (vitamin D3) 125 MCG (5000 UT) tablet tablet, Daily., Disp: , Rfl:   •  eplerenone (INSPRA) 50 MG tablet, Take 1 tablet by mouth Daily., Disp: , Rfl:   •  estradiol (MINIVELLE, VIVELLE-DOT) 0.05 MG/24HR patch, , Disp: , Rfl:   •  fluconazole (Diflucan) 150 MG tablet, Take 1 tablet by mouth As Needed (may take now and repeat in 3 days PRN)., Disp: 2 tablet, Rfl: 0  •  fluticasone (FLONASE) 50 MCG/ACT nasal spray, 2 sprays into the nostril(s) as directed by provider Daily., Disp: 18.2 mL, Rfl: 11  •  ibuprofen (ADVIL,MOTRIN) 800 MG tablet, Take 1 tablet by mouth Every 6 (Six) Hours As Needed for Mild Pain ., Disp: 90 tablet, Rfl: 1  •  Liraglutide (Saxenda) 18 MG/3ML injection pen, 3 mg daily., Disp: 5 pen, Rfl: 5  •  progesterone (PROMETRIUM) 100 MG capsule, Take 100 mg by mouth Every Night., Disp: , Rfl:   •  spironolactone (ALDACTONE) 50 MG tablet, , Disp: , Rfl:      Review of Systems   Constitutional: Negative for chills, fatigue, fever and unexpected weight change.   Respiratory: Negative for cough, chest tightness, shortness of breath and wheezing.    Cardiovascular: Negative for chest pain, palpitations and leg swelling.   Gastrointestinal: Negative for constipation, diarrhea, nausea and vomiting.   Genitourinary: Positive for vaginal discharge. Negative for difficulty urinating and dysuria.   Skin: Negative for color change and rash.   Neurological: Positive for syncope. Negative for  dizziness, weakness and headaches.   Psychiatric/Behavioral: Negative for sleep disturbance.       Objective     Vitals:    02/10/22 1610   BP: 110/70   Pulse: 70   Temp: 97.8 °F (36.6 °C)   SpO2: 98%         02/10/22  1610   Weight: 76.5 kg (168 lb 9.6 oz)     Body mass index is 27.23 kg/m².  Results for orders placed or performed in visit on 01/13/22   Comprehensive Metabolic Panel    Specimen: Blood   Result Value Ref Range    Glucose 80 65 - 99 mg/dL    BUN 14 6 - 20 mg/dL    Creatinine 0.96 0.57 - 1.00 mg/dL    Sodium 140 136 - 145 mmol/L    Potassium 4.8 3.5 - 5.2 mmol/L    Chloride 105 98 - 107 mmol/L    CO2 24.6 22.0 - 29.0 mmol/L    Calcium 9.4 8.6 - 10.5 mg/dL    Total Protein 7.1 6.0 - 8.5 g/dL    Albumin 4.70 3.50 - 5.20 g/dL    ALT (SGPT) 14 1 - 33 U/L    AST (SGOT) 17 1 - 32 U/L    Alkaline Phosphatase 62 39 - 117 U/L    Total Bilirubin 0.4 0.0 - 1.2 mg/dL    eGFR  African Amer 73 >60 mL/min/1.73    Globulin 2.4 gm/dL    A/G Ratio 2.0 g/dL    BUN/Creatinine Ratio 14.6 7.0 - 25.0    Anion Gap 10.4 5.0 - 15.0 mmol/L   Lipid Panel    Specimen: Blood   Result Value Ref Range    Total Cholesterol 197 0 - 200 mg/dL    Triglycerides 59 0 - 150 mg/dL    HDL Cholesterol 75 (H) 40 - 60 mg/dL    LDL Cholesterol  111 (H) 0 - 100 mg/dL    VLDL Cholesterol 11 5 - 40 mg/dL    LDL/HDL Ratio 1.47    Lipase    Specimen: Blood   Result Value Ref Range    Lipase 30 13 - 60 U/L       Physical Exam  Vitals reviewed.   Constitutional:       Appearance: She is well-developed.   HENT:      Head: Normocephalic and atraumatic.   Cardiovascular:      Rate and Rhythm: Normal rate and regular rhythm.      Heart sounds: Normal heart sounds.   Pulmonary:      Effort: Pulmonary effort is normal.      Breath sounds: Normal breath sounds.   Skin:     General: Skin is warm and dry.   Neurological:      Mental Status: She is alert and oriented to person, place, and time.   Psychiatric:         Behavior: Behavior normal.          Assessment/Plan   Problems Addressed this Visit     None      Visit Diagnoses     Vaginal yeast infection    -  Primary    Relevant Medications    fluconazole (Diflucan) 150 MG tablet    Vasovagal syncope          Diagnoses       Codes Comments    Vaginal yeast infection    -  Primary ICD-10-CM: B37.3  ICD-9-CM: 112.1     Vasovagal syncope     ICD-10-CM: R55  ICD-9-CM: 780.2       For recurrent vaginal yeast infection I will send in Diflucan for her.  I am also going to obtain records from her most recent visit to her OB/GYN.    For vasovagal syncope episode we will get a watch for now and if this recurs again we will do further testing and valuation.    Time spent on visit, including counseling, education, reviewing the chart, and any recent test results, was   13     Minutes    Return visit in PRN    Counseling provided on Vasovagal syncope.    Star Garcia, APRN   2/10/2022   17:07 EST     Please note that portions of this document were completed with a voice recognition program.

## 2022-03-17 ENCOUNTER — TELEPHONE (OUTPATIENT)
Dept: FAMILY MEDICINE CLINIC | Facility: CLINIC | Age: 57
End: 2022-03-17

## 2022-03-17 NOTE — TELEPHONE ENCOUNTER
Caller: Tammi Richardson    Relationship: Self    Best call back number: [phone]    What is the best time to reach you: ANYTIME     Who are you requesting to speak with (clinical staff, provider,  specific staff member): MAUREEN WRIGHT         What was the call regarding: THE PATIENT IS CALLING TO SPEAK TO MAUREEN WRIGHT REGARDING QUESTIONS ABOUT BILLING     Do you require a callback: YES

## 2022-03-17 NOTE — TELEPHONE ENCOUNTER
Contacted pt & informed her that her [employer] message as well as this phone encounter has been routed to the practice manger but she is out of the office.Pt verbalized understanding & appreciated the follow up call & would also like a call from the practice manger at her earliest convenience.

## 2022-03-25 NOTE — TELEPHONE ENCOUNTER
Spoke with patient. Advised that we will try to re-arrange the diagnosis codes to see if her insurance will cover her February visit. Patient asked if she should go ahead and pay the balance that she was billed for. Advised patient to contact the billing department to see if that balance will remain due if the claim is resubmitted. Asked patient to call me back with any further questions.

## 2022-04-25 DIAGNOSIS — E66.09 CLASS 1 OBESITY DUE TO EXCESS CALORIES WITHOUT SERIOUS COMORBIDITY WITH BODY MASS INDEX (BMI) OF 33.0 TO 33.9 IN ADULT: ICD-10-CM

## 2022-04-25 RX ORDER — LIRAGLUTIDE 6 MG/ML
INJECTION, SOLUTION SUBCUTANEOUS
Qty: 5 PEN | Refills: 5 | Status: SHIPPED | OUTPATIENT
Start: 2022-04-25 | End: 2022-12-13 | Stop reason: SDUPTHER

## 2022-04-25 NOTE — TELEPHONE ENCOUNTER
Rx Refill Note  Requested Prescriptions     Signed Prescriptions Disp Refills   • Liraglutide (Saxenda) 18 MG/3ML injection pen 5 pen 5     Sig: 3 mg daily.     Authorizing Provider: SVEN PEÑA     Ordering User: LARY HOPKINS      Last office visit with prescribing clinician: 2/10/2022      Next office visit with prescribing clinician: 1/17/2023            Lary Hopkins MA  04/25/22, 10:00 EDT

## 2022-05-19 ENCOUNTER — TRANSCRIBE ORDERS (OUTPATIENT)
Dept: ADMINISTRATIVE | Facility: HOSPITAL | Age: 57
End: 2022-05-19

## 2022-05-19 DIAGNOSIS — Z12.31 VISIT FOR SCREENING MAMMOGRAM: Primary | ICD-10-CM

## 2022-06-03 ENCOUNTER — HOSPITAL ENCOUNTER (OUTPATIENT)
Dept: MAMMOGRAPHY | Facility: HOSPITAL | Age: 57
Discharge: HOME | End: 2022-06-03
Admitting: NURSE PRACTITIONER

## 2022-06-03 DIAGNOSIS — Z12.31 VISIT FOR SCREENING MAMMOGRAM: ICD-10-CM

## 2022-06-03 PROCEDURE — 77063 BREAST TOMOSYNTHESIS BI: CPT | Performed by: RADIOLOGY

## 2022-06-03 PROCEDURE — 77063 BREAST TOMOSYNTHESIS BI: CPT

## 2022-06-03 PROCEDURE — 77067 SCR MAMMO BI INCL CAD: CPT

## 2022-06-03 PROCEDURE — 77067 SCR MAMMO BI INCL CAD: CPT | Performed by: RADIOLOGY

## 2022-07-15 ENCOUNTER — OFFICE VISIT (OUTPATIENT)
Dept: FAMILY MEDICINE CLINIC | Facility: CLINIC | Age: 57
End: 2022-07-15

## 2022-07-15 VITALS
HEART RATE: 91 BPM | WEIGHT: 156.8 LBS | HEIGHT: 66 IN | BODY MASS INDEX: 25.2 KG/M2 | TEMPERATURE: 97.5 F | SYSTOLIC BLOOD PRESSURE: 115 MMHG | DIASTOLIC BLOOD PRESSURE: 70 MMHG | OXYGEN SATURATION: 98 %

## 2022-07-15 DIAGNOSIS — N89.8 VAGINAL DISCHARGE: Primary | ICD-10-CM

## 2022-07-15 LAB
BILIRUB BLD-MCNC: NEGATIVE MG/DL
CLARITY, POC: CLEAR
COLOR UR: YELLOW
EXPIRATION DATE: ABNORMAL
GLUCOSE UR STRIP-MCNC: NEGATIVE MG/DL
KETONES UR QL: NEGATIVE
LEUKOCYTE EST, POC: NEGATIVE
Lab: ABNORMAL
NITRITE UR-MCNC: NEGATIVE MG/ML
PH UR: 6 [PH] (ref 5–8)
PROT UR STRIP-MCNC: NEGATIVE MG/DL
RBC # UR STRIP: ABNORMAL /UL
SP GR UR: 1.03 (ref 1–1.03)
UROBILINOGEN UR QL: NORMAL

## 2022-07-15 PROCEDURE — 99213 OFFICE O/P EST LOW 20 MIN: CPT | Performed by: PHYSICIAN ASSISTANT

## 2022-07-15 PROCEDURE — 81003 URINALYSIS AUTO W/O SCOPE: CPT | Performed by: PHYSICIAN ASSISTANT

## 2022-07-17 NOTE — PROGRESS NOTES
Chief Complaint   Patient presents with   • Urinary Tract Infection     Patient states that she is having discharges (no smell) Thinks she might be going through menopause         HPI      Tammi Richardson is a 57 y.o. female who presents for Urinary Tract Infection (Patient states that she is having discharges (no smell) Thinks she might be going through menopause  )    Patient is established with gynecology and recently had vaginal swab which was unremarkable.  She reports worsening vaginal discharge.  No smell or itching.  Mild discoloration.  She is having to wear a pad due to the quantity of discharge.  Gynecology encouraged her to trial boric acid suppositories which she has not tried yet.    Past Medical History:   Diagnosis Date   • Allergic    • DDD (degenerative disc disease), lumbar    • History of gallstones    • Low back pain    • Obesity        Past Surgical History:   Procedure Laterality Date   • CHOLECYSTECTOMY     • COLONOSCOPY     • TOE SURGERY Left     Fifth toe       Family History   Problem Relation Age of Onset   • Lung cancer Mother    • Cancer Mother         Lung 10/09/1990   • Hyperlipidemia Mother          10/09/1990   • Stomach cancer Father    • Cancer Father         Stomach 1997   • Hyperlipidemia Father          1997   • Heart disease Brother          7/10/2021   • Hyperlipidemia Brother          07/10/2021   • Cancer Sister         Colon 2017   • Hyperlipidemia Sister          2017   • Cancer Maternal Aunt         Pancreatic 2005   • Cancer Paternal Aunt          2016   • Cancer Paternal Aunt         Living   • COPD Sister          2018   • Hyperlipidemia Sister          2018   • Hyperlipidemia Sister          2014   • Kidney disease Sister         Dialysis 2014   • Hyperlipidemia Sister         Living   • Breast cancer Neg Hx    • Ovarian cancer Neg Hx        Social History  "    Socioeconomic History   • Marital status: Single   Tobacco Use   • Smoking status: Never Smoker   • Smokeless tobacco: Never Used   Vaping Use   • Vaping Use: Never used   Substance and Sexual Activity   • Alcohol use: Yes     Alcohol/week: 1.0 standard drink     Types: 1 Glasses of wine per week     Comment: Twice a year   • Drug use: No   • Sexual activity: Not Currently     Partners: Male     Birth control/protection: Abstinence       Allergies   Allergen Reactions   • Mobic [Meloxicam] GI Intolerance   • Nsaids GI Intolerance       ROS    Review of Systems   Genitourinary: Positive for vaginal discharge.   Skin: Negative for rash.       Vitals:    07/15/22 0911   BP: 115/70   Pulse: 91   Temp: 97.5 °F (36.4 °C)   SpO2: 98%   Weight: 71.1 kg (156 lb 12.8 oz)   Height: 167.6 cm (65.98\")     Body mass index is 25.32 kg/m².    Current Outpatient Medications on File Prior to Visit   Medication Sig Dispense Refill   • azelastine (ASTELIN) 0.1 % nasal spray 2 sprays into the nostril(s) as directed by provider 2 (Two) Times a Day. 1 each 12   • Calcium Carbonate 1500 (600 Ca) MG tablet Daily.     • Cholecalciferol (vitamin D3) 125 MCG (5000 UT) tablet tablet Daily.     • eplerenone (INSPRA) 50 MG tablet Take 1 tablet by mouth Daily.     • estradiol (MINIVELLE, VIVELLE-DOT) 0.05 MG/24HR patch      • fluconazole (Diflucan) 150 MG tablet Take 1 tablet by mouth As Needed (may take now and repeat in 3 days PRN). 2 tablet 0   • fluticasone (FLONASE) 50 MCG/ACT nasal spray 2 sprays into the nostril(s) as directed by provider Daily. 18.2 mL 11   • ibuprofen (ADVIL,MOTRIN) 800 MG tablet Take 1 tablet by mouth Every 6 (Six) Hours As Needed for Mild Pain . 90 tablet 1   • Liraglutide (Saxenda) 18 MG/3ML injection pen 3 mg daily. 5 pen 5   • progesterone (PROMETRIUM) 100 MG capsule Take 100 mg by mouth Every Night.     • spironolactone (ALDACTONE) 50 MG tablet        No current facility-administered medications on file prior to " visit.       Results for orders placed or performed in visit on 07/15/22   POCT urinalysis dipstick, automated    Specimen: Urine   Result Value Ref Range    Color Yellow Yellow, Straw, Dark Yellow, Madeline    Clarity, UA Clear Clear    Specific Gravity  1.030 1.005 - 1.030    pH, Urine 6.0 5.0 - 8.0    Leukocytes Negative Negative    Nitrite, UA Negative Negative    Protein, POC Negative Negative mg/dL    Glucose, UA Negative Negative mg/dL    Ketones, UA Negative Negative    Urobilinogen, UA Normal Normal    Bilirubin Negative Negative    Blood, UA Trace (A) Negative    Lot Number [id number]     Expiration Date 11/24/2023        PE    Physical Exam  Vitals reviewed.   Constitutional:       General: She is not in acute distress.     Appearance: Normal appearance. She is well-developed and normal weight. She is not ill-appearing or diaphoretic.   HENT:      Head: Normocephalic and atraumatic.   Eyes:      Extraocular Movements: Extraocular movements intact.      Conjunctiva/sclera: Conjunctivae normal.   Pulmonary:      Effort: No respiratory distress.   Musculoskeletal:         General: Normal range of motion.      Cervical back: Normal range of motion.   Neurological:      General: No focal deficit present.      Mental Status: She is alert.   Psychiatric:         Attention and Perception: She is attentive.         Mood and Affect: Mood normal.         Speech: Speech normal.         Behavior: Behavior normal. Behavior is cooperative.         Thought Content: Thought content normal.         Judgment: Judgment normal.          A/P    Diagnoses and all orders for this visit:    1. Vaginal discharge (Primary)  -     POCT urinalysis dipstick, automated  Urinalysis is normal.  Recent vaginal swab at gynecologist office was normal. Unable to see result in chart as gynecologist is not on [facility].  If vaginal swab did not check for chlamydia/gonorrhea and trich, would recommend that she repeat swab to ensure there was no STD  infection as cause of discharge.  Denies smell or rash with discharge.  Recommend trial of boric acid as discussed by gynecologist.  May want to trial a daily probiotic for female health.  Follow-up with gynecology.       Plan of care reviewed with patient at the conclusion of today's visit. Education was provided regarding diagnosis, management and any prescribed or recommended OTC medications.  Patient verbalizes understanding of and agreement with management plan.    No follow-ups on file.     Taina Stinson PA-C    Answers for HPI/ROS submitted by the patient on 7/15/2022  What is the primary reason for your visit?: Vaginal Discharge/Irritation

## 2022-08-05 ENCOUNTER — TRANSCRIBE ORDERS (OUTPATIENT)
Dept: ADMINISTRATIVE | Facility: HOSPITAL | Age: 57
End: 2022-08-05

## 2022-08-05 DIAGNOSIS — Z12.31 VISIT FOR SCREENING MAMMOGRAM: Primary | ICD-10-CM

## 2022-09-12 DIAGNOSIS — E66.3 OVERWEIGHT WITH BODY MASS INDEX (BMI) 25.0-29.9: Primary | ICD-10-CM

## 2022-09-12 RX ORDER — PEN NEEDLE, DIABETIC 32GX 5/32"
1 NEEDLE, DISPOSABLE MISCELLANEOUS DAILY
Qty: 50 EACH | Refills: 2 | Status: SHIPPED | OUTPATIENT
Start: 2022-09-12 | End: 2022-12-29 | Stop reason: SDUPTHER

## 2022-09-12 NOTE — TELEPHONE ENCOUNTER
Rx Refill Note  Requested Prescriptions     Signed Prescriptions Disp Refills   • Insulin Pen Needle (AUM Mini Insulin Pen Needle) 32G X 4 MM misc 50 each 2     Si each Daily.     Authorizing Provider: SVEN PEÑA     Ordering User: LARY HOPKINS      Last office visit with prescribing clinician: 2/10/2022      Next office visit with prescribing clinician: 2023       {TIP  Please add Last Relevant Lab Date if appropriate:23}     Lary Hopkins MA  22, 09:30 EDT

## 2022-11-09 DIAGNOSIS — M51.36 DDD (DEGENERATIVE DISC DISEASE), LUMBAR: ICD-10-CM

## 2022-11-10 RX ORDER — IBUPROFEN 800 MG/1
TABLET ORAL
Qty: 180 TABLET | Refills: 0 | Status: SHIPPED | OUTPATIENT
Start: 2022-11-10

## 2022-11-10 NOTE — TELEPHONE ENCOUNTER
Rx Refill Note  Requested Prescriptions     Pending Prescriptions Disp Refills   • ibuprofen (ADVIL,MOTRIN) 800 MG tablet [Pharmacy Med Name: Ibuprofen 800 MG Oral Tablet] 180 tablet 0     Sig: TAKE 1 TABLET BY MOUTH EVERY 6 HOURS AS NEEDED FOR MILD PAIN      Last office visit with prescribing clinician: 2/10/2022      Next office visit with prescribing clinician: 1/17/2023            Yudy Prakash MA  11/10/22, 09:46 EST

## 2022-11-16 ENCOUNTER — PATIENT MESSAGE (OUTPATIENT)
Dept: FAMILY MEDICINE CLINIC | Facility: CLINIC | Age: 57
End: 2022-11-16

## 2022-11-16 RX ORDER — AMOXICILLIN AND CLAVULANATE POTASSIUM 875; 125 MG/1; MG/1
1 TABLET, FILM COATED ORAL 2 TIMES DAILY
Qty: 14 TABLET | Refills: 0 | Status: SHIPPED | OUTPATIENT
Start: 2022-11-16 | End: 2023-01-03

## 2022-12-13 ENCOUNTER — TELEPHONE (OUTPATIENT)
Dept: FAMILY MEDICINE CLINIC | Facility: CLINIC | Age: 57
End: 2022-12-13

## 2022-12-13 DIAGNOSIS — E66.09 CLASS 1 OBESITY DUE TO EXCESS CALORIES WITHOUT SERIOUS COMORBIDITY WITH BODY MASS INDEX (BMI) OF 33.0 TO 33.9 IN ADULT: ICD-10-CM

## 2022-12-13 RX ORDER — LIRAGLUTIDE 6 MG/ML
INJECTION, SOLUTION SUBCUTANEOUS
Qty: 3 ML | Refills: 1 | Status: SHIPPED | OUTPATIENT
Start: 2022-12-13 | End: 2023-01-17 | Stop reason: SDUPTHER

## 2022-12-13 NOTE — TELEPHONE ENCOUNTER
----- Message from Tammi Richardson sent at 12/13/2022  9:24 AM EST -----  Regarding: Fortunato  Contact: [phone]  Good morning, Star,  I have two days left on my last Saxenda pen, could you please refill my prescription? I didn't realize there were no more refills left until last night.  Thank you.

## 2022-12-13 NOTE — TELEPHONE ENCOUNTER
Rx Refill Note  Requested Prescriptions     Pending Prescriptions Disp Refills   • Liraglutide (Saxenda) 18 MG/3ML injection pen 3 mL 1     Sig: 3 mg daily.      Last office visit with prescribing clinician: 2/10/2022   Last telemedicine visit with prescribing clinician: 1/17/2023   Next office visit with prescribing clinician: 1/17/2023                         Would you like a call back once the refill request has been completed: [] Yes [] No    If the office needs to give you a call back, can they leave a voicemail: [] Yes [] No    Joel Wilson MA  12/13/22, 16:04 EST

## 2022-12-29 ENCOUNTER — PATIENT MESSAGE (OUTPATIENT)
Dept: FAMILY MEDICINE CLINIC | Facility: CLINIC | Age: 57
End: 2022-12-29

## 2022-12-29 DIAGNOSIS — E66.3 OVERWEIGHT WITH BODY MASS INDEX (BMI) 25.0-29.9: ICD-10-CM

## 2022-12-29 RX ORDER — PEN NEEDLE, DIABETIC 32GX 5/32"
1 NEEDLE, DISPOSABLE MISCELLANEOUS DAILY
Qty: 50 EACH | Refills: 2 | Status: SHIPPED | OUTPATIENT
Start: 2022-12-29

## 2022-12-29 NOTE — TELEPHONE ENCOUNTER
Rx Refill Note  Requested Prescriptions     Signed Prescriptions Disp Refills   • Insulin Pen Needle (AUM Mini Insulin Pen Needle) 32G X 4 MM misc 50 each 2     Si each Daily.     Authorizing Provider: SVEN PEÑA     Ordering User: REJI FINCH      Last office visit with prescribing clinician: 2/10/2022   Next office visit with prescribing clinician: 2023       Reji Finch MA  22, 10:30 EST

## 2022-12-30 ENCOUNTER — TELEPHONE (OUTPATIENT)
Dept: FAMILY MEDICINE CLINIC | Facility: CLINIC | Age: 57
End: 2022-12-30

## 2022-12-30 NOTE — TELEPHONE ENCOUNTER
Caller: Tammi Richardson    Relationship: Self    Best call back number: [phone]    What was the call regarding: PATIENT IS STATING SHE HAS BLOOD IN HER STOOL.     Do you require a callback: YES

## 2022-12-30 NOTE — TELEPHONE ENCOUNTER
Patient reports diarrhea for the past 3 days along with minor cramping. She noticed blood in stool for the first time this morning.     I have scheduled an upcoming appt with another provider for further evaluation

## 2023-01-03 ENCOUNTER — OFFICE VISIT (OUTPATIENT)
Dept: FAMILY MEDICINE CLINIC | Facility: CLINIC | Age: 58
End: 2023-01-03
Payer: COMMERCIAL

## 2023-01-03 ENCOUNTER — HOSPITAL ENCOUNTER (OUTPATIENT)
Dept: CT IMAGING | Facility: HOSPITAL | Age: 58
Discharge: HOME | End: 2023-01-03
Admitting: INTERNAL MEDICINE
Payer: COMMERCIAL

## 2023-01-03 VITALS
SYSTOLIC BLOOD PRESSURE: 118 MMHG | DIASTOLIC BLOOD PRESSURE: 74 MMHG | BODY MASS INDEX: 23.11 KG/M2 | WEIGHT: 143.8 LBS | OXYGEN SATURATION: 99 % | HEART RATE: 73 BPM | HEIGHT: 66 IN

## 2023-01-03 DIAGNOSIS — K62.5 RECTAL BLEEDING: ICD-10-CM

## 2023-01-03 DIAGNOSIS — K62.5 RECTAL BLEEDING: Primary | ICD-10-CM

## 2023-01-03 DIAGNOSIS — R10.32 ACUTE LEFT LOWER QUADRANT PAIN: ICD-10-CM

## 2023-01-03 PROCEDURE — 74176 CT ABD & PELVIS W/O CONTRAST: CPT

## 2023-01-03 PROCEDURE — 99214 OFFICE O/P EST MOD 30 MIN: CPT | Performed by: INTERNAL MEDICINE

## 2023-01-03 RX ORDER — AMOXICILLIN AND CLAVULANATE POTASSIUM 875; 125 MG/1; MG/1
1 TABLET, FILM COATED ORAL 2 TIMES DAILY
Qty: 20 TABLET | Refills: 0 | Status: SHIPPED | OUTPATIENT
Start: 2023-01-03 | End: 2023-01-17

## 2023-01-03 NOTE — PROGRESS NOTES
Tammi Richardson  1965  [id number]  Patient Care Team:  Star Garcia APRN as PCP - General (Family Medicine)    Tammi Richardson is a 57 y.o. female here today for follow up.     This patient is accompanied by their self who contributes to the history of their care.    Chief Complaint:    Chief Complaint   Patient presents with   • Rectal Bleeding     Had issues Friday but no longer an issue.   • Diarrhea   • Abdominal Pain     Sharp pain        History of Present Illness:  I have reviewed and/or updated the patient's past medical, past surgical, family, social history, problem list and allergies as appropriate.     Is a 57-year-old female with history of diverticulosis and internal hemorrhoids noted on colonoscopy on 11/2/2020.  She called the office last week for complaints of blood in her stool as well as diarrhea.  She also reports sharp abdominal pain. The blood was on the stool. Stool later changes to diarrhea. Symptoms began with pain. Never had before. Denies non volitional weight loss weight loss.  She is on Saxenda.. There previous week bhe had nausea, but non since. No emesis. The bleeding has stopped. She is still having abdominal pain.  Further blood in her stool however her stool remains soft.    Review of Systems   Constitutional: Positive for fatigue.   HENT: Negative.    Gastrointestinal: Positive for abdominal pain, blood in stool and nausea.   Genitourinary: Negative.        Vitals:    01/03/23 0935   BP: 118/74   Pulse: 73   SpO2: 99%   Weight: 65.2 kg (143 lb 12.8 oz)   Height: 167.6 cm (65.98\")     Body mass index is 23.22 kg/m².    Physical Exam  Vitals and nursing note reviewed.   Constitutional:       General: She is not in acute distress.     Appearance: She is well-developed. She is not diaphoretic.   HENT:      Head: Normocephalic and atraumatic.      Right Ear: External ear normal.      Left Ear: External ear normal.      Mouth/Throat:      Pharynx: No  oropharyngeal exudate.   Eyes:      General: No scleral icterus.        Right eye: No discharge.      Conjunctiva/sclera: Conjunctivae normal.   Neck:      Thyroid: No thyromegaly.      Vascular: No JVD.      Trachea: No tracheal deviation.   Cardiovascular:      Rate and Rhythm: Normal rate and regular rhythm.      Heart sounds: Normal heart sounds.      Comments: PMI nondisplaced  Pulmonary:      Effort: Pulmonary effort is normal.      Breath sounds: Normal breath sounds. No wheezing or rales.   Abdominal:      General: Bowel sounds are normal.      Palpations: Abdomen is soft. There is no mass.      Tenderness: There is abdominal tenderness. There is no guarding or rebound.      Hernia: No hernia is present.      Comments: Left lower quadrant tenderness to palpation without rebound.   Musculoskeletal:      Cervical back: Normal range of motion and neck supple.      Comments: Normal gait   Lymphadenopathy:      Cervical: No cervical adenopathy.   Skin:     General: Skin is warm and dry.      Capillary Refill: Capillary refill takes less than 2 seconds.      Coloration: Skin is not pale.      Findings: No rash.   Neurological:      Mental Status: She is alert and oriented to person, place, and time.      Motor: No abnormal muscle tone.      Coordination: Coordination normal.   Psychiatric:         Judgment: Judgment normal.         Procedures    Results Review:    I reviewed the patient's new clinical results.  Colonoscopy report was reviewed    Assessment/Plan:     Problem List Items Addressed This Visit    None  Visit Diagnoses     Rectal bleeding    -  Primary    Relevant Orders    CT Abdomen Pelvis Without Contrast    Ambulatory Referral to Colorectal Surgery    Acute left lower quadrant pain        Relevant Orders    CT Abdomen Pelvis Without Contrast    Ambulatory Referral to Colorectal Surgery      57-year-old female with a history of known internal hemorrhoids as well as diverticulosis presents with a  several day history of abdominal pain 1 day history of rectal bleeding.  Bleeding is improved however pain persist.  I am concerned about diverticulitis.  I requested a CT of her abdomen pelvis surgeon as well as colorectal surgery evaluation.  Augmentin 875 1 tablet p.o. twice daily.  10-day course.  If symptoms return with regards to bleeding or pain worsens she should seek care in the emergency room.    Plan of care reviewed with patient at the conclusion of today's visit. Education was provided regarding diagnosis and management.  Patient verbalizes understanding of and agreement with management plan.    Return for Next scheduled follow up.    Dontae Mccabe MD      Please note than portions of this note were completed [facility] a Voice Recognition Program

## 2023-01-04 ENCOUNTER — PATIENT MESSAGE (OUTPATIENT)
Dept: FAMILY MEDICINE CLINIC | Facility: CLINIC | Age: 58
End: 2023-01-04
Payer: COMMERCIAL

## 2023-01-04 NOTE — PROGRESS NOTES
No evidence of.diverticulitis by ct. I still would like her to see.colorectal.for blood in her stool

## 2023-01-12 ENCOUNTER — PATIENT MESSAGE (OUTPATIENT)
Dept: FAMILY MEDICINE CLINIC | Facility: CLINIC | Age: 58
End: 2023-01-12
Payer: COMMERCIAL

## 2023-01-17 ENCOUNTER — PATIENT MESSAGE (OUTPATIENT)
Dept: FAMILY MEDICINE CLINIC | Facility: CLINIC | Age: 58
End: 2023-01-17

## 2023-01-17 ENCOUNTER — OFFICE VISIT (OUTPATIENT)
Dept: FAMILY MEDICINE CLINIC | Facility: CLINIC | Age: 58
End: 2023-01-17
Payer: COMMERCIAL

## 2023-01-17 ENCOUNTER — LAB (OUTPATIENT)
Dept: LAB | Facility: HOSPITAL | Age: 58
End: 2023-01-17
Payer: COMMERCIAL

## 2023-01-17 VITALS
WEIGHT: 141.8 LBS | OXYGEN SATURATION: 97 % | BODY MASS INDEX: 22.79 KG/M2 | HEART RATE: 85 BPM | TEMPERATURE: 97.8 F | SYSTOLIC BLOOD PRESSURE: 110 MMHG | DIASTOLIC BLOOD PRESSURE: 70 MMHG | HEIGHT: 66 IN

## 2023-01-17 DIAGNOSIS — E55.9 VITAMIN D DEFICIENCY: ICD-10-CM

## 2023-01-17 DIAGNOSIS — Z13.1 SCREENING FOR DIABETES MELLITUS: ICD-10-CM

## 2023-01-17 DIAGNOSIS — Z00.00 WELLNESS EXAMINATION: Primary | ICD-10-CM

## 2023-01-17 DIAGNOSIS — Z00.00 WELLNESS EXAMINATION: ICD-10-CM

## 2023-01-17 DIAGNOSIS — Z13.29 SCREENING FOR THYROID DISORDER: ICD-10-CM

## 2023-01-17 DIAGNOSIS — E66.09 CLASS 1 OBESITY DUE TO EXCESS CALORIES WITHOUT SERIOUS COMORBIDITY WITH BODY MASS INDEX (BMI) OF 33.0 TO 33.9 IN ADULT: ICD-10-CM

## 2023-01-17 DIAGNOSIS — Z13.220 SCREENING FOR LIPID DISORDERS: ICD-10-CM

## 2023-01-17 DIAGNOSIS — Z13.0 SCREENING FOR DEFICIENCY ANEMIA: ICD-10-CM

## 2023-01-17 PROBLEM — J30.2 SEASONAL ALLERGIES: Status: ACTIVE | Noted: 2023-01-17

## 2023-01-17 LAB
ALBUMIN SERPL-MCNC: 5 G/DL (ref 3.5–5.2)
ALBUMIN/GLOB SERPL: 1.7 G/DL
ALP SERPL-CCNC: 70 U/L (ref 39–117)
ALT SERPL W P-5'-P-CCNC: 16 U/L (ref 1–33)
ANION GAP SERPL CALCULATED.3IONS-SCNC: 8 MMOL/L (ref 5–15)
AST SERPL-CCNC: 20 U/L (ref 1–32)
BILIRUB SERPL-MCNC: 0.4 MG/DL (ref 0–1.2)
BUN SERPL-MCNC: 17 MG/DL (ref 6–20)
BUN/CREAT SERPL: 15.3 (ref 7–25)
CALCIUM SPEC-SCNC: 10 MG/DL (ref 8.6–10.5)
CHLORIDE SERPL-SCNC: 102 MMOL/L (ref 98–107)
CHOLEST SERPL-MCNC: 233 MG/DL (ref 0–200)
CO2 SERPL-SCNC: 26 MMOL/L (ref 22–29)
CREAT SERPL-MCNC: 1.11 MG/DL (ref 0.57–1)
DEPRECATED RDW RBC AUTO: 41.3 FL (ref 37–54)
EGFRCR SERPLBLD CKD-EPI 2021: 58.1 ML/MIN/1.73
ERYTHROCYTE [DISTWIDTH] IN BLOOD BY AUTOMATED COUNT: 11.8 % (ref 12.3–15.4)
GLOBULIN UR ELPH-MCNC: 2.9 GM/DL
GLUCOSE SERPL-MCNC: 79 MG/DL (ref 65–99)
HCT VFR BLD AUTO: 41.1 % (ref 34–46.6)
HDLC SERPL-MCNC: 75 MG/DL (ref 40–60)
HGB BLD-MCNC: 14.1 G/DL (ref 12–15.9)
LDLC SERPL CALC-MCNC: 146 MG/DL (ref 0–100)
LDLC/HDLC SERPL: 1.92 {RATIO}
MCH RBC QN AUTO: 32.6 PG (ref 26.6–33)
MCHC RBC AUTO-ENTMCNC: 34.3 G/DL (ref 31.5–35.7)
MCV RBC AUTO: 95.1 FL (ref 79–97)
PLATELET # BLD AUTO: 384 10*3/MM3 (ref 140–450)
PMV BLD AUTO: 10.4 FL (ref 6–12)
POTASSIUM SERPL-SCNC: 4.5 MMOL/L (ref 3.5–5.2)
PROT SERPL-MCNC: 7.9 G/DL (ref 6–8.5)
RBC # BLD AUTO: 4.32 10*6/MM3 (ref 3.77–5.28)
SODIUM SERPL-SCNC: 136 MMOL/L (ref 136–145)
TRIGL SERPL-MCNC: 69 MG/DL (ref 0–150)
TSH SERPL DL<=0.05 MIU/L-ACNC: 0.74 UIU/ML (ref 0.27–4.2)
VLDLC SERPL-MCNC: 12 MG/DL (ref 5–40)
WBC NRBC COR # BLD: 7.05 10*3/MM3 (ref 3.4–10.8)

## 2023-01-17 PROCEDURE — 99396 PREV VISIT EST AGE 40-64: CPT | Performed by: NURSE PRACTITIONER

## 2023-01-17 PROCEDURE — 80061 LIPID PANEL: CPT

## 2023-01-17 PROCEDURE — 36415 COLL VENOUS BLD VENIPUNCTURE: CPT

## 2023-01-17 PROCEDURE — 80050 GENERAL HEALTH PANEL: CPT

## 2023-01-17 RX ORDER — LIRAGLUTIDE 6 MG/ML
INJECTION, SOLUTION SUBCUTANEOUS
Qty: 3 ML | Refills: 0 | Status: SHIPPED | OUTPATIENT
Start: 2023-01-17

## 2023-01-17 NOTE — PROGRESS NOTES
Patient Care Team:  Star Garcia APRN as PCP - General (Family Medicine)     Chief complaint: Patient is in today for a physical     Patient is a 57 y.o. female who presents for her yearly physical exam.     HPI patient today for routine yearly physical exam.  She has been having some clear nasal drainage for the past 10 days to 2 weeks.  Her weight is down about 25 pounds.    Review of Systems   Constitutional: Negative for appetite change, chills, fatigue and fever.   HENT: Positive for congestion and rhinorrhea. Negative for ear pain, hearing loss, postnasal drip, sinus pressure and sore throat.    Eyes: Negative for itching and visual disturbance (contacts).   Respiratory: Negative for cough, shortness of breath and wheezing.    Cardiovascular: Negative for chest pain, palpitations and leg swelling.   Gastrointestinal: Positive for constipation. Negative for abdominal pain, blood in stool, diarrhea, nausea and vomiting.   Endocrine: Negative for cold intolerance, heat intolerance, polydipsia, polyphagia and polyuria.   Genitourinary: Negative for difficulty urinating, dysuria and hematuria.   Musculoskeletal: Positive for back pain. Negative for arthralgias, joint swelling, myalgias and neck pain.   Skin: Positive for rash (eczema). Negative for wound.   Allergic/Immunologic: Negative for environmental allergies and food allergies.   Neurological: Positive for light-headedness (occasional if get up too quick) and numbness (occasional if standing too long). Negative for dizziness and headaches.   Hematological: Negative for adenopathy. Does not bruise/bleed easily.   Psychiatric/Behavioral: Positive for sleep disturbance ( mostly 5-6 hrs per night). Negative for dysphoric mood. The patient is not nervous/anxious.       History  Past Medical History:   Diagnosis Date   • Allergic    • DDD (degenerative disc disease), lumbar    • History of gallstones    • Low back pain    • Obesity       Past Surgical  History:   Procedure Laterality Date   • CHOLECYSTECTOMY     • COLONOSCOPY     • TOE SURGERY Left     Fifth toe      Allergies   Allergen Reactions   • Mobic [Meloxicam] GI Intolerance   • Nsaids GI Intolerance      Family History   Problem Relation Age of Onset   • Lung cancer Mother    • Cancer Mother         Lung 10/09/1990   • Hyperlipidemia Mother          10/09/1990   • Stomach cancer Father    • Cancer Father         Stomach 1997   • Hyperlipidemia Father          1997   • Heart disease Brother          7/10/2021   • Hyperlipidemia Brother          07/10/2021   • Cancer Sister         Colon 2017   • Hyperlipidemia Sister          2017   • Cancer Maternal Aunt         Pancreatic 2005   • Cancer Paternal Aunt          2016   • Cancer Paternal Aunt          2022   • COPD Sister          2018   • Hyperlipidemia Sister          2018   • Kidney disease Sister         Dialysis 2018   • Hyperlipidemia Sister          2014   • Kidney disease Sister         Dialysis 2014   • Hyperlipidemia Sister         Living   • Breast cancer Neg Hx    • Ovarian cancer Neg Hx      Social History     Socioeconomic History   • Marital status: Single   Tobacco Use   • Smoking status: Never   • Smokeless tobacco: Never   Vaping Use   • Vaping Use: Never used   Substance and Sexual Activity   • Alcohol use: Yes     Alcohol/week: 1.0 standard drink     Types: 1 Glasses of wine per week     Comment: Twice a year   • Drug use: No   • Sexual activity: Not Currently     Partners: Male     Birth control/protection: Abstinence        Current Outpatient Medications:   •  Calcium Carbonate 1500 (600 Ca) MG tablet, Daily., Disp: , Rfl:   •  Cholecalciferol (vitamin D3) 125 MCG (5000 UT) tablet tablet, Daily., Disp: , Rfl:   •  estradiol (MINIVELLE, VIVELLE-DOT) 0.05 MG/24HR patch, , Disp: , Rfl:   •  ibuprofen (ADVIL,MOTRIN) 800 MG  tablet, TAKE 1 TABLET BY MOUTH EVERY 6 HOURS AS NEEDED FOR MILD PAIN, Disp: 180 tablet, Rfl: 0  •  Insulin Pen Needle (AUM Mini Insulin Pen Needle) 32G X 4 MM misc, 1 each Daily., Disp: 50 each, Rfl: 2  •  linaclotide (Linzess) 290 MCG capsule capsule, Take 1 capsule by mouth Every Morning Before Breakfast., Disp: 90 capsule, Rfl: 1  •  Liraglutide (Saxenda) 18 MG/3ML injection pen, 3 mg daily., Disp: 3 mL, Rfl: 0  •  progesterone (PROMETRIUM) 100 MG capsule, Take 100 mg by mouth Every Night., Disp: , Rfl:   •  spironolactone (ALDACTONE) 50 MG tablet, Take 150 mg by mouth Daily., Disp: , Rfl:     Immunizations   N/A   Prescribed/Refused   Date     Td/Tdap  (Booster Q 10 yrs)   [x]           Prescribed    []     Refused        []           Flu  (Yearly)   [x]        Prescribed    []     Refused        []           Pneumonia      [x]        Prescribed    []     Refused        []                 Hep B     [x]        Prescribed    []     Refused        []           Shingles  (Age 50 and older)   []        Prescribed    [x]     Refused        []           Immunization History   Administered Date(s) Administered   • COVID-19 (PFIZER) BIVALENT BOOSTER 12+YRS 10/15/2022   • COVID-19 (PFIZER) PURPLE CAP 03/10/2021, 03/31/2021, 10/15/2021   • Flu Vaccine Quad PF >36MO 10/15/2021   • FluLaval/Fluzone >6mos 10/06/2020   • Fluzone Quad >6mos (Multi-dose) 10/06/2020   • Hepatitis A 12/31/2018, 07/05/2019   • Influenza Injectable Mdck Pf Quad 10/15/2022   • Influenza Quad Vaccine (Inpatient) 10/06/2020   • Influenza, Unspecified 10/25/2018, 10/30/2019, 10/15/2021, 10/15/2022   • Shingrix 01/03/2020   • Tdap 01/01/2010, 01/03/2020, 10/19/2022     Health Maintenance   Topic Date Due   • ZOSTER VACCINE (2 of 2) 02/28/2020   • ANNUAL PHYSICAL  01/13/2023   • PAP SMEAR  01/31/2023 (Originally 12/28/2020)   • MAMMOGRAM  06/03/2024   • COLORECTAL CANCER SCREENING  11/02/2025   • TDAP/TD VACCINES (4 - Td or Tdap) 10/19/2032   • HEPATITIS  "C SCREENING  Completed   • COVID-19 Vaccine  Completed   • INFLUENZA VACCINE  Completed   • Pneumococcal Vaccine 0-64  Aged Out        Diabetes  [] Yes  [x] No   N/A      Date     Eye Exam     []             []   Complete     []   Recommended Date:  Where:       Foot Exam     []         []   Complete          Obesity Counseling     []       []   Complete     No results found for: HGBA1C, MICROALBUR    Additional Testing      Date     Colorectal Screening       [x]   N/A   []   Complete    []   Ordered     Date:    Where:       Pap      []   N/A   []   Complete   [x]   OB/GYN Date:    Where:       Mammogram        []   N/A   []   Complete   [x]  OB/GYN   []   Ordered Date:    Where:     PSA  (Over age 50)    [x]   N/A   []   Complete   []   Ordered Date:    Where:     US Aorta  (For male smokers, age 65)     [x]   N/A   []   Complete   []   Ordered Date:    Where:     CT for Smoker  (Age 55-75, 30 pk yr)    [x]   N/A   []   Complete   []   Ordered Date:    Where:     Bone Density/DEXA      [x]   N/A   []   Complete   []   Ordered Date:    Follow-up:     Hep. C        []   N/A   [x]   Complete   []   Ordered Date:    Where:     Results for orders placed or performed in visit on 07/15/22   POCT urinalysis dipstick, automated    Specimen: Urine   Result Value Ref Range    Color Yellow Yellow, Straw, Dark Yellow, Madeline    Clarity, UA Clear Clear    Specific Gravity  1.030 1.005 - 1.030    pH, Urine 6.0 5.0 - 8.0    Leukocytes Negative Negative    Nitrite, UA Negative Negative    Protein, POC Negative Negative mg/dL    Glucose, UA Negative Negative mg/dL    Ketones, UA Negative Negative    Urobilinogen, UA Normal Normal    Bilirubin Negative Negative    Blood, UA Trace (A) Negative    Lot Number [id number]     Expiration Date 11/24/2023             /70   Pulse 85   Temp 97.8 °F (36.6 °C)   Ht 167.6 cm (65.98\")   Wt 64.3 kg (141 lb 12.8 oz)   SpO2 97%   BMI 22.90 kg/m²       Physical Exam  Vitals reviewed. "   Constitutional:       Appearance: She is well-developed.   HENT:      Head: Normocephalic and atraumatic.      Right Ear: External ear normal.      Left Ear: External ear normal.   Eyes:      Pupils: Pupils are equal, round, and reactive to light.   Neck:      Thyroid: No thyromegaly.      Vascular: No JVD.   Cardiovascular:      Rate and Rhythm: Normal rate and regular rhythm.      Heart sounds: Normal heart sounds.   Pulmonary:      Effort: Pulmonary effort is normal. No retractions.      Breath sounds: Normal breath sounds.   Chest:      Chest wall: No mass, lacerations, deformity, swelling, tenderness, crepitus or edema.   Breasts:     Breasts are symmetrical.   Abdominal:      General: Bowel sounds are normal. There is no distension.      Palpations: Abdomen is soft.      Tenderness: There is no abdominal tenderness. There is no guarding.   Genitourinary:     Comments: deferred  Musculoskeletal:         General: Normal range of motion.      Cervical back: Normal range of motion and neck supple.   Lymphadenopathy:      Cervical: No cervical adenopathy.   Skin:     General: Skin is warm and dry.      Findings: No erythema or rash.   Neurological:      Mental Status: She is alert and oriented to person, place, and time.   Psychiatric:         Behavior: Behavior normal.             Counseling provided on weight management.    Diagnoses and all orders for this visit:    Wellness examination  -     Comprehensive Metabolic Panel; Future  -     Lipid Panel; Future  -     TSH Rfx On Abnormal To Free T4; Future  -     CBC (No Diff); Future    Screening for diabetes mellitus  -     Comprehensive Metabolic Panel; Future    Screening for lipid disorders  -     Lipid Panel; Future    Vitamin D deficiency    Screening for thyroid disorder  -     TSH Rfx On Abnormal To Free T4; Future    Screening for deficiency anemia  -     CBC (No Diff); Future    Class 1 obesity due to excess calories without serious comorbidity with  body mass index (BMI) of 33.0 to 33.9 in adult  -     Liraglutide (Saxenda) 18 MG/3ML injection pen; 3 mg daily.    The preventative exam has been reviewed in detail.  Counseling/Anticipatory Guidance discussion included discussing nutrition needs, physical activity, healthy weight, injury prevention, misuse of tobacco, alcohol and drugs, sexual behavior, dental health, mental health, immunizations, and needed screenings has been discussed. The patient has been assisted with scheduling healthcare procedures for the coming year and given a written document outlining these recommendations. Age-appropriate screening measures have been ordered for the patient today as indicated above.    Will obtain routine labs today and make further recommendations pending results. All lab results are automatically released to [employer] immediately when they return whether they have been reviewed or not. The patient will be notified about the results, regardless of the findings. If they have not been contacted by the office within 2 weeks after the test has been performed, I want them to contact us to learn about the results.    Discussed need for weight management.  I recommend they use a weight loss elaine on their phone, eat no more than [id number] bhavna/day, and exercise 30 to 60 minutes 3 times a week.  Discussed weight loss with diet, recommend Weight Watchers or Mediterranean Diet, but stated that whatever method works for this patient is best. The patient agrees with all recommendations at this time. I have discussed a weight loss plan to be determined by this patient. We will wean her off Saxenda, weight loss goal met.     RV- 1 yr       Star Garcia, APRN   1/17/2023   09:00 EST          Please note that portions of this document were completed with a voice recognition program.     At [facility], we believe that sharing information builds trust and better relationships. You are receiving this note because you are receiving  care at [facility] or have recently visited. It is possible you will see health information before a provider has talked with you about it. This kind of information can be easy to misunderstand. To help you fully understand what it means for your health, we urge you to discuss this note with your provider.

## 2023-01-17 NOTE — PATIENT INSTRUCTIONS
Obesity, Adult  Obesity is the condition of having too much total body fat. Being overweight or obese means that your weight is greater than what is considered healthy for your body size. Obesity is determined by a measurement called BMI (body mass index). BMI is an estimate of body fat and is calculated from height and weight. For adults, a BMI of 30 or higher is considered obese.  Obesity can lead to other health concerns and major illnesses, including:  Stroke.  Coronary artery disease (CAD).  Type 2 diabetes.  Some types of cancer, including cancers of the colon, breast, uterus, and gallbladder.  High blood pressure (hypertension).  High cholesterol.  Gallbladder stones.  Obesity can also contribute to:  Osteoarthritis.  Sleep apnea.  Infertility problems.  What are the causes?  Common causes of this condition include:  Eating daily meals that are high in calories, sugar, and fat.  Drinking high amounts of sugar-sweetened beverages, such as soft drinks.  Being born with genes that may make you more likely to become obese.  Having a medical condition that causes obesity, including:  Hypothyroidism.  Polycystic ovarian syndrome (PCOS).  Binge-eating disorder.  Cushing syndrome.  Taking certain medicines, such as steroids, antidepressants, and seizure medicines.  Not being physically active (sedentary lifestyle).  Not getting enough sleep.  What increases the risk?  The following factors may make you more likely to develop this condition:  Having a family history of obesity.  Living in an area with limited access to:  [place], recreation centers, or sidewalks.  Healthy food choices, such as grocery stores and [employer] markets.  What are the signs or symptoms?  The main sign of this condition is having too much body fat.  How is this diagnosed?  This condition is diagnosed based on:  Your BMI. If you are an adult with a BMI of 30 or higher, you are considered obese.  Your waist circumference. This measures the  distance around your waistline.  Your skinfold thickness. Your health care provider may gently pinch a fold of your skin and measure it.  You may have other tests to check for underlying conditions.  How is this treated?  Treatment for this condition often includes changing your lifestyle. Treatment may include some or all of the following:  Dietary changes. This may include developing a healthy meal plan.  Regular physical activity. This may include activity that causes your heart to beat faster (aerobic exercise) and strength training. Work with your health care provider to design an exercise program that works for you.  Medicine to help you lose weight if you are unable to lose one pound a week after six weeks of healthy eating and more physical activity.  Treating conditions that cause the obesity (underlying conditions).  Surgery. Surgical options may include gastric banding and gastric bypass. Surgery may be done if:  Other treatments have not helped to improve your condition.  You have a BMI of 40 or higher.  You have life-threatening health problems related to obesity.  Follow these instructions at home:  Eating and drinking    Follow recommendations from your health care provider about what you eat and drink. Your health care provider may advise you to:  Limit fast food, sweets, and processed snack foods.  Choose low-fat options, such as low-fat milk instead of whole milk.  Eat five or more servings of fruits or vegetables every day.  Choose healthy foods when you eat out.  Keep low-fat snacks available.  Limit sugary drinks, such as soda, fruit juice, sweetened iced tea, and flavored milk.  Drink enough water to keep your urine pale yellow.  Do not follow a fad diet. Fad diets can be unhealthy and even dangerous.  Other healthful choices include:  Eat at home more often. This gives you more control over what you eat.  Learn to read food labels. This will help you understand how much food is considered one  serving.  Learn what a healthy serving size is.  Physical activity  Exercise regularly, as told by your health care provider.  Most adults should get up to 150 minutes of moderate-intensity exercise every week.  Ask your health care provider what types of exercise are safe for you and how often you should exercise.  Warm up and stretch before being active.  Cool down and stretch after being active.  Rest between periods of activity.  Lifestyle  Work with your health care provider and a dietitian to set a weight-loss goal that is healthy and reasonable for you.  Limit your screen time.  Find ways to reward yourself that do not involve food.  Do not drink alcohol if:  Your health care provider tells you not to drink.  You are pregnant, may be pregnant, or are planning to become pregnant.  If you drink alcohol:  Limit how much you have to:  0-1 drink a day for women.  0-2 drinks a day for men.  Know how much alcohol is in your drink. In the [place] one drink equals one 12 oz bottle of beer (355 mL), one 5 oz glass of wine (148 mL), or one 1½ oz glass of hard liquor (44 mL).  General instructions  Keep a weight-loss journal to keep track of the food you eat and how much exercise you get.  Take over-the-counter and prescription medicines only as told by your health care provider.  Take vitamins and supplements only as told by your health care provider.  Consider joining a support group. Your health care provider may be able to recommend a support group.  Pay attention to your mental health as obesity can lead to depression or self esteem issues.  Keep all follow-up visits. This is important.  Contact a health care provider if:  You are unable to meet your weight-loss goal after six weeks of dietary and lifestyle changes.  You have trouble breathing.  Summary  Obesity is the condition of having too much total body fat.  Being overweight or obese means that your weight is greater than what is considered healthy for your body  size.  Work with your health care provider and a dietitian to set a weight-loss goal that is healthy and reasonable for you.  Exercise regularly, as told by your health care provider. Ask your health care provider what types of exercise are safe for you and how often you should exercise.  This information is not intended to replace advice given to you by your health care provider. Make sure you discuss any questions you have with your health care provider.  Document Revised: 07/26/2022 Document Reviewed: 07/26/2022  Elsevier Patient Education © 2022 [employer]

## 2023-01-18 RX ORDER — FLUCONAZOLE 150 MG/1
150 TABLET ORAL AS NEEDED
Qty: 2 TABLET | Refills: 0 | Status: SHIPPED | OUTPATIENT
Start: 2023-01-18

## 2023-01-18 RX ORDER — BROMPHENIRAMINE MALEATE, PSEUDOEPHEDRINE HYDROCHLORIDE, AND DEXTROMETHORPHAN HYDROBROMIDE 2; 30; 10 MG/5ML; MG/5ML; MG/5ML
5 SYRUP ORAL 4 TIMES DAILY PRN
Qty: 120 ML | Refills: 1 | Status: SHIPPED | OUTPATIENT
Start: 2023-01-18

## 2023-03-22 ENCOUNTER — PATIENT MESSAGE (OUTPATIENT)
Dept: FAMILY MEDICINE CLINIC | Facility: CLINIC | Age: 58
End: 2023-03-22
Payer: COMMERCIAL

## 2023-05-03 ENCOUNTER — PATIENT MESSAGE (OUTPATIENT)
Dept: FAMILY MEDICINE CLINIC | Facility: CLINIC | Age: 58
End: 2023-05-03
Payer: COMMERCIAL

## 2023-05-03 DIAGNOSIS — E78.2 ELEVATED TRIGLYCERIDES WITH HIGH CHOLESTEROL: Primary | ICD-10-CM

## 2023-05-04 ENCOUNTER — LAB (OUTPATIENT)
Dept: LAB | Facility: HOSPITAL | Age: 58
End: 2023-05-04
Payer: COMMERCIAL

## 2023-05-04 DIAGNOSIS — E78.2 ELEVATED TRIGLYCERIDES WITH HIGH CHOLESTEROL: ICD-10-CM

## 2023-05-04 LAB
CHOLEST SERPL-MCNC: 213 MG/DL (ref 0–200)
HDLC SERPL-MCNC: 83 MG/DL (ref 40–60)
LDLC SERPL CALC-MCNC: 118 MG/DL (ref 0–100)
LDLC/HDLC SERPL: 1.4 {RATIO}
TRIGL SERPL-MCNC: 68 MG/DL (ref 0–150)
VLDLC SERPL-MCNC: 12 MG/DL (ref 5–40)

## 2023-05-04 PROCEDURE — 80061 LIPID PANEL: CPT

## 2023-06-06 ENCOUNTER — HOSPITAL ENCOUNTER (OUTPATIENT)
Dept: MAMMOGRAPHY | Facility: HOSPITAL | Age: 58
Discharge: HOME | End: 2023-06-06
Admitting: NURSE PRACTITIONER
Payer: COMMERCIAL

## 2023-06-06 DIAGNOSIS — Z12.31 VISIT FOR SCREENING MAMMOGRAM: ICD-10-CM

## 2023-06-06 PROCEDURE — 77063 BREAST TOMOSYNTHESIS BI: CPT

## 2023-06-06 PROCEDURE — 77067 SCR MAMMO BI INCL CAD: CPT

## 2024-01-19 ENCOUNTER — OFFICE VISIT (OUTPATIENT)
Age: 59
End: 2024-01-19
Payer: COMMERCIAL

## 2024-01-19 ENCOUNTER — LAB (OUTPATIENT)
Age: 59
End: 2024-01-19
Payer: COMMERCIAL

## 2024-01-19 VITALS
OXYGEN SATURATION: 100 % | BODY MASS INDEX: 25.94 KG/M2 | WEIGHT: 161.4 LBS | HEART RATE: 80 BPM | HEIGHT: 66 IN | DIASTOLIC BLOOD PRESSURE: 78 MMHG | SYSTOLIC BLOOD PRESSURE: 112 MMHG | TEMPERATURE: 97.5 F

## 2024-01-19 DIAGNOSIS — Z13.29 SCREENING FOR THYROID DISORDER: ICD-10-CM

## 2024-01-19 DIAGNOSIS — Z13.0 SCREENING FOR DEFICIENCY ANEMIA: ICD-10-CM

## 2024-01-19 DIAGNOSIS — E55.9 VITAMIN D DEFICIENCY: ICD-10-CM

## 2024-01-19 DIAGNOSIS — Z13.1 SCREENING FOR DIABETES MELLITUS: ICD-10-CM

## 2024-01-19 DIAGNOSIS — H69.93 EUSTACHIAN TUBE DYSFUNCTION, BILATERAL: ICD-10-CM

## 2024-01-19 DIAGNOSIS — Z13.0 SCREENING FOR IRON DEFICIENCY ANEMIA: ICD-10-CM

## 2024-01-19 DIAGNOSIS — E55.9 AVITAMINOSIS D: ICD-10-CM

## 2024-01-19 DIAGNOSIS — E66.3 OVERWEIGHT WITH BODY MASS INDEX (BMI) 25.0-29.9: ICD-10-CM

## 2024-01-19 DIAGNOSIS — J30.2 SEASONAL ALLERGIES: ICD-10-CM

## 2024-01-19 DIAGNOSIS — Z00.00 ROUTINE GENERAL MEDICAL EXAMINATION AT A HEALTH CARE FACILITY: ICD-10-CM

## 2024-01-19 DIAGNOSIS — Z13.220 SCREENING FOR LIPID DISORDERS: ICD-10-CM

## 2024-01-19 DIAGNOSIS — Z13.1 SCREENING FOR DIABETES MELLITUS: Primary | ICD-10-CM

## 2024-01-19 DIAGNOSIS — Z00.00 WELLNESS EXAMINATION: Primary | ICD-10-CM

## 2024-01-19 DIAGNOSIS — Z13.220 SCREENING FOR LIPOID DISORDERS: ICD-10-CM

## 2024-01-19 DIAGNOSIS — Z00.00 WELLNESS EXAMINATION: ICD-10-CM

## 2024-01-19 LAB
25(OH)D3 SERPL-MCNC: 87.3 NG/ML (ref 30–100)
ALBUMIN SERPL-MCNC: 4.5 G/DL (ref 3.5–5.2)
ALBUMIN/GLOB SERPL: 1.7 G/DL
ALP SERPL-CCNC: 66 U/L (ref 39–117)
ALT SERPL W P-5'-P-CCNC: 16 U/L (ref 1–33)
ANION GAP SERPL CALCULATED.3IONS-SCNC: 12.2 MMOL/L (ref 5–15)
AST SERPL-CCNC: 21 U/L (ref 1–32)
BILIRUB SERPL-MCNC: 0.4 MG/DL (ref 0–1.2)
BUN SERPL-MCNC: 13 MG/DL (ref 6–20)
BUN/CREAT SERPL: 10.3 (ref 7–25)
CALCIUM SPEC-SCNC: 10 MG/DL (ref 8.6–10.5)
CHLORIDE SERPL-SCNC: 102 MMOL/L (ref 98–107)
CHOLEST SERPL-MCNC: 231 MG/DL (ref 0–200)
CO2 SERPL-SCNC: 24.8 MMOL/L (ref 22–29)
CREAT SERPL-MCNC: 1.26 MG/DL (ref 0.57–1)
DEPRECATED RDW RBC AUTO: 40.7 FL (ref 37–54)
EGFRCR SERPLBLD CKD-EPI 2021: 49.6 ML/MIN/1.73
ERYTHROCYTE [DISTWIDTH] IN BLOOD BY AUTOMATED COUNT: 11.9 % (ref 12.3–15.4)
GLOBULIN UR ELPH-MCNC: 2.7 GM/DL
GLUCOSE SERPL-MCNC: 81 MG/DL (ref 65–99)
HCT VFR BLD AUTO: 41.8 % (ref 34–46.6)
HDLC SERPL-MCNC: 83 MG/DL (ref 40–60)
HGB BLD-MCNC: 14.7 G/DL (ref 12–15.9)
LDLC SERPL CALC-MCNC: 131 MG/DL (ref 0–100)
LDLC/HDLC SERPL: 1.54 {RATIO}
MCH RBC QN AUTO: 33 PG (ref 26.6–33)
MCHC RBC AUTO-ENTMCNC: 35.2 G/DL (ref 31.5–35.7)
MCV RBC AUTO: 93.7 FL (ref 79–97)
PLATELET # BLD AUTO: 338 10*3/MM3 (ref 140–450)
PMV BLD AUTO: 11.3 FL (ref 6–12)
POTASSIUM SERPL-SCNC: 5.6 MMOL/L (ref 3.5–5.2)
PROT SERPL-MCNC: 7.2 G/DL (ref 6–8.5)
RBC # BLD AUTO: 4.46 10*6/MM3 (ref 3.77–5.28)
SODIUM SERPL-SCNC: 139 MMOL/L (ref 136–145)
TRIGL SERPL-MCNC: 101 MG/DL (ref 0–150)
TSH SERPL DL<=0.05 MIU/L-ACNC: 1.05 UIU/ML (ref 0.27–4.2)
VLDLC SERPL-MCNC: 17 MG/DL (ref 5–40)
WBC NRBC COR # BLD AUTO: 8.08 10*3/MM3 (ref 3.4–10.8)

## 2024-01-19 PROCEDURE — 80050 GENERAL HEALTH PANEL: CPT | Performed by: NURSE PRACTITIONER

## 2024-01-19 PROCEDURE — 82306 VITAMIN D 25 HYDROXY: CPT | Performed by: NURSE PRACTITIONER

## 2024-01-19 PROCEDURE — 80061 LIPID PANEL: CPT | Performed by: NURSE PRACTITIONER

## 2024-01-19 RX ORDER — PROGESTERONE 100 MG/1
CAPSULE ORAL
COMMUNITY
Start: 2023-12-29 | End: 2024-01-19

## 2024-01-19 RX ORDER — FLUTICASONE PROPIONATE 50 MCG
2 SPRAY, SUSPENSION (ML) NASAL EVERY 24 HOURS
COMMUNITY
End: 2024-01-19 | Stop reason: SDUPTHER

## 2024-01-19 RX ORDER — LOTEPREDNOL ETABONATE 5 MG/ML
SUSPENSION/ DROPS OPHTHALMIC
COMMUNITY
Start: 2023-10-03 | End: 2024-01-19

## 2024-01-19 RX ORDER — MINOXIDIL 2.5 MG/1
TABLET ORAL
COMMUNITY
Start: 2023-12-29 | End: 2024-01-19

## 2024-01-19 RX ORDER — FLUTICASONE PROPIONATE 50 MCG
2 SPRAY, SUSPENSION (ML) NASAL EVERY 24 HOURS
Qty: 54.6 ML | Refills: 3 | Status: SHIPPED | OUTPATIENT
Start: 2024-01-19

## 2024-01-19 RX ORDER — AZELASTINE 1 MG/ML
2 SPRAY, METERED NASAL 2 TIMES DAILY
Qty: 3 EACH | Refills: 3 | Status: SHIPPED | OUTPATIENT
Start: 2024-01-19

## 2024-01-19 NOTE — PATIENT INSTRUCTIONS
Obesity, Adult  Obesity is the condition of having too much total body fat. Being overweight or obese means that your weight is greater than what is considered healthy for your body size. Obesity is determined by a measurement called BMI (body mass index). BMI is an estimate of body fat and is calculated from height and weight. For adults, a BMI of 30 or higher is considered obese.  Obesity can lead to other health concerns and major illnesses, including:  Stroke.  Coronary artery disease (CAD).  Type 2 diabetes.  Some types of cancer, including cancers of the colon, breast, uterus, and gallbladder.  High blood pressure (hypertension).  High cholesterol.  Gallbladder stones.  Obesity can also contribute to:  Osteoarthritis.  Sleep apnea.  Infertility problems.  What are the causes?  Common causes of this condition include:  Eating daily meals that are high in calories, sugar, and fat.  Drinking high amounts of sugar-sweetened beverages, such as soft drinks.  Being born with genes that may make you more likely to become obese.  Having a medical condition that causes obesity, including:  Hypothyroidism.  Polycystic ovarian syndrome (PCOS).  Binge-eating disorder.  Cushing syndrome.  Taking certain medicines, such as steroids, antidepressants, and seizure medicines.  Not being physically active (sedentary lifestyle).  Not getting enough sleep.  What increases the risk?  The following factors may make you more likely to develop this condition:  Having a family history of obesity.  Living in an area with limited access to:  [place], recreation centers, or sidewalks.  Healthy food choices, such as grocery stores and [employer] markets.  What are the signs or symptoms?  The main sign of this condition is having too much body fat.  How is this diagnosed?  This condition is diagnosed based on:  Your BMI. If you are an adult with a BMI of 30 or higher, you are considered obese.  Your waist circumference. This measures the  distance around your waistline.  Your skinfold thickness. Your health care provider may gently pinch a fold of your skin and measure it.  You may have other tests to check for underlying conditions.  How is this treated?  Treatment for this condition often includes changing your lifestyle. Treatment may include some or all of the following:  Dietary changes. This may include developing a healthy meal plan.  Regular physical activity. This may include activity that causes your heart to beat faster (aerobic exercise) and strength training. Work with your health care provider to design an exercise program that works for you.  Medicine to help you lose weight if you are unable to lose one pound a week after six weeks of healthy eating and more physical activity.  Treating conditions that cause the obesity (underlying conditions).  Surgery. Surgical options may include gastric banding and gastric bypass. Surgery may be done if:  Other treatments have not helped to improve your condition.  You have a BMI of 40 or higher.  You have life-threatening health problems related to obesity.  Follow these instructions at home:  Eating and drinking    Follow recommendations from your health care provider about what you eat and drink. Your health care provider may advise you to:  Limit fast food, sweets, and processed snack foods.  Choose low-fat options, such as low-fat milk instead of whole milk.  Eat five or more servings of fruits or vegetables every day.  Choose healthy foods when you eat out.  Keep low-fat snacks available.  Limit sugary drinks, such as soda, fruit juice, sweetened iced tea, and flavored milk.  Drink enough water to keep your urine pale yellow.  Do not follow a fad diet. Fad diets can be unhealthy and even dangerous.  Other healthful choices include:  Eat at home more often. This gives you more control over what you eat.  Learn to read food labels. This will help you understand how much food is considered one  serving.  Learn what a healthy serving size is.  Physical activity  Exercise regularly, as told by your health care provider.  Most adults should get up to 150 minutes of moderate-intensity exercise every week.  Ask your health care provider what types of exercise are safe for you and how often you should exercise.  Warm up and stretch before being active.  Cool down and stretch after being active.  Rest between periods of activity.  Lifestyle  Work with your health care provider and a dietitian to set a weight-loss goal that is healthy and reasonable for you.  Limit your screen time.  Find ways to reward yourself that do not involve food.  Do not drink alcohol if:  Your health care provider tells you not to drink.  You are pregnant, may be pregnant, or are planning to become pregnant.  If you drink alcohol:  Limit how much you have to:  0-1 drink a day for women.  0-2 drinks a day for men.  Know how much alcohol is in your drink. In the [place] one drink equals one 12 oz bottle of beer (355 mL), one 5 oz glass of wine (148 mL), or one 1½ oz glass of hard liquor (44 mL).  General instructions  Keep a weight-loss journal to keep track of the food you eat and how much exercise you get.  Take over-the-counter and prescription medicines only as told by your health care provider.  Take vitamins and supplements only as told by your health care provider.  Consider joining a support group. Your health care provider may be able to recommend a support group.  Pay attention to your mental health as obesity can lead to depression or self esteem issues.  Keep all follow-up visits. This is important.  Contact a health care provider if:  You are unable to meet your weight-loss goal after six weeks of dietary and lifestyle changes.  You have trouble breathing.  Summary  Obesity is the condition of having too much total body fat.  Being overweight or obese means that your weight is greater than what is considered healthy for your body  size.  Work with your health care provider and a dietitian to set a weight-loss goal that is healthy and reasonable for you.  Exercise regularly, as told by your health care provider. Ask your health care provider what types of exercise are safe for you and how often you should exercise.  This information is not intended to replace advice given to you by your health care provider. Make sure you discuss any questions you have with your health care provider.  Document Revised: 07/26/2022 Document Reviewed: 07/26/2022  Elsevier Patient Education © 2023 [employer]

## 2024-01-19 NOTE — PROGRESS NOTES
Patient Care Team:  Star Garcia APRN as PCP - General (Family Medicine)     Chief complaint: Patient is in today for a physical     Patient is a 58 y.o. female who presents for her yearly physical exam.     HPI Patient today for routine yearly physical exam. Weight is up 20 lbs from last visit. Off [employer].     Review of Systems   Constitutional:  Negative for appetite change, chills, fatigue and fever.   HENT:  Positive for ear pain (right pressure) and rhinorrhea. Negative for congestion, hearing loss, sinus pressure and sore throat.    Eyes:  Negative for itching and visual disturbance.   Respiratory:  Negative for cough, shortness of breath and wheezing.    Cardiovascular:  Negative for chest pain, palpitations and leg swelling.   Gastrointestinal:  Negative for abdominal pain, blood in stool, constipation, diarrhea, nausea and vomiting.   Endocrine: Negative for cold intolerance, heat intolerance, polydipsia, polyphagia and polyuria.   Genitourinary:  Negative for difficulty urinating, dysuria, hematuria and menstrual problem (LMP November, Last PAP, 1 yr).   Musculoskeletal:  Positive for arthralgias (right shoulder) and back pain. Negative for joint swelling, myalgias and neck pain.   Skin:  Positive for rash (czema). Negative for wound.   Allergic/Immunologic: Negative for environmental allergies and food allergies.   Neurological:  Positive for numbness (arms at times due to back). Negative for dizziness, light-headedness and headaches.   Hematological:  Negative for adenopathy. Does not bruise/bleed easily.   Psychiatric/Behavioral:  Negative for dysphoric mood and sleep disturbance. The patient is not nervous/anxious.       History  Past Medical History:   Diagnosis Date    Allergic     DDD (degenerative disc disease), lumbar     History of gallstones     Low back pain     Obesity       Past Surgical History:   Procedure Laterality Date    CHOLECYSTECTOMY      COLONOSCOPY      TOE SURGERY Left      Fifth toe      Allergies   Allergen Reactions    Mobic [Meloxicam] GI Intolerance    Nsaids GI Intolerance      Family History   Problem Relation Age of Onset    Lung cancer Mother     Cancer Mother         Lung 10/09/1990    Hyperlipidemia Mother          10/09/1990    Stomach cancer Father     Cancer Father         Stomach 1997    Hyperlipidemia Father          1997    Heart disease Brother          7/10/2021    Hyperlipidemia Brother          07/10/2021    Cancer Sister         Colon 2017    Hyperlipidemia Sister          2017    Cancer Maternal Aunt         Pancreatic 2005    Cancer Paternal Aunt          2016    Cancer Paternal Aunt          2022    COPD Sister          2018    Hyperlipidemia Sister          2018    Kidney disease Sister         Dialysis 2018    Hyperlipidemia Sister          2014    Kidney disease Sister         Dialysis 2014    Hyperlipidemia Sister         Living    Breast cancer Neg Hx     Ovarian cancer Neg Hx      Social History     Socioeconomic History    Marital status: Single   Tobacco Use    Smoking status: Never    Smokeless tobacco: Never   Vaping Use    Vaping Use: Never used   Substance and Sexual Activity    Alcohol use: Yes     Alcohol/week: 1.0 standard drink of alcohol     Types: 1 Glasses of wine per week     Comment: Twice a year    Drug use: No    Sexual activity: Not Currently     Partners: Male     Birth control/protection: Abstinence        Current Outpatient Medications:     Calcium Carbonate 1500 (600 Ca) MG tablet, Daily., Disp: , Rfl:     Cholecalciferol (vitamin D3) 125 MCG (5000 UT) tablet tablet, Daily., Disp: , Rfl:     estradiol (MINIVELLE, VIVELLE-DOT) 0.05 MG/24HR patch, , Disp: , Rfl:     fluticasone (Flonase Allergy Relief) 50 MCG/ACT nasal spray, 2 sprays into the nostril(s) as directed by provider Daily., Disp: 54.6 mL, Rfl: 3    progesterone  (PROMETRIUM) 100 MG capsule, Take 1 capsule by mouth Every Night., Disp: , Rfl:     spironolactone (ALDACTONE) 50 MG tablet, Take 3 tablets by mouth Daily., Disp: , Rfl:     azelastine (ASTELIN) 0.1 % nasal spray, 2 sprays into the nostril(s) as directed by provider 2 (Two) Times a Day., Disp: 3 each, Rfl: 3    Immunizations   N/A   Prescribed/Refused   Date     Td/Tdap  (Booster Q 10 yrs)   [x]           Prescribed    []     Refused        []           Flu  (Yearly)   [x]        Prescribed    []     Refused        []           Pneumonia      [x]        Prescribed    []     Refused        []                 Hep B     [x]        Prescribed    []     Refused        []           Shingles  (Age 50 and older)   []        Prescribed    [x]     Refused        []           Immunization History   Administered Date(s) Administered    COVID-19 (PFIZER) BIVALENT 12+YRS 10/15/2022    COVID-19 (PFIZER) Purple Cap Monovalent 03/10/2021, 03/31/2021, 10/15/2021    COVID-19 F23 (PFIZER) 12YRS+ (COMIRNATY) 10/15/2023    Flu Vaccine Quad PF >36MO 10/15/2021    Fluzone (or Fluarix & Flulaval for VFC) >6mos 10/06/2020, 10/15/2021, 10/15/2022    Fluzone Quad >6mos (Multi-dose) 10/06/2020    Hepatitis A 12/31/2018, 07/05/2019    Influenza Injectable Mdck Pf Quad 10/15/2022, 10/15/2023    Influenza Quad Vaccine (Inpatient) 10/06/2020    Influenza, Unspecified 10/25/2018, 10/30/2019, 10/15/2021, 10/15/2022    Shingrix 01/03/2020    Tdap 01/01/2010, 01/03/2020, 10/19/2022     Health Maintenance   Topic Date Due    ZOSTER VACCINE (2 of 2) 02/28/2020    PAP SMEAR  12/28/2020    LIPID PANEL  05/04/2024    ANNUAL PHYSICAL  01/19/2025    BMI FOLLOWUP  01/19/2025    MAMMOGRAM  06/06/2025    COLORECTAL CANCER SCREENING  03/29/2028    TDAP/TD VACCINES (4 - Td or Tdap) 10/19/2032    HEPATITIS C SCREENING  Completed    COVID-19 Vaccine  Completed    INFLUENZA VACCINE  Completed    Pneumococcal Vaccine 0-64  Aged Out        Diabetes  [] Yes  [x] No  "  N/A      Date     Eye Exam     []             []   Complete     []   Recommended Date:  Where:       Foot Exam     []         []   Complete          Obesity Counseling     []       []   Complete     No results found for: \"HGBA1C\", \"MICROALBUR\"    Additional Testing      Date     Colorectal Screening       []   N/A   [x]   Complete    []   Ordered     Date:    Where:       Pap      []   N/A   []   Complete   [x]   OB/GYN Date:    Where:       Mammogram        []   N/A   []   Complete   [x]  OB/GYN   []   Ordered Date:    Where:     PSA  (Over age 50)    [x]   N/A   []   Complete   []   Ordered Date:    Where:     US Aorta  (For male smokers, age 65)     [x]   N/A   []   Complete   []   Ordered Date:    Where:     CT for Smoker  (Age 55-75, 30 pk yr)    [x]   N/A   []   Complete   []   Ordered Date:    Where:     Bone Density/DEXA      [x]   N/A   []   Complete   []   Ordered Date:    Follow-up:     Hep. C        []   N/A   [x]   Complete   []   Ordered Date:    Where:     Results for orders placed or performed in visit on 05/04/23   Lipid Panel    Specimen: Blood   Result Value Ref Range    Total Cholesterol 213 (H) 0 - 200 mg/dL    Triglycerides 68 0 - 150 mg/dL    HDL Cholesterol 83 (H) 40 - 60 mg/dL    LDL Cholesterol  118 (H) 0 - 100 mg/dL    VLDL Cholesterol 12 5 - 40 mg/dL    LDL/HDL Ratio 1.40             /78 (BP Location: Right arm, Patient Position: Sitting, Cuff Size: Adult)   Pulse 80   Temp 97.5 °F (36.4 °C) (Temporal)   Ht 167.6 cm (65.98\")   Wt 73.2 kg (161 lb 6.4 oz)   SpO2 100%   BMI 26.06 kg/m²       Physical Exam  Vitals reviewed.   Constitutional:       Appearance: She is well-developed.   HENT:      Head: Normocephalic and atraumatic.      Right Ear: External ear normal.      Left Ear: External ear normal.   Eyes:      Pupils: Pupils are equal, round, and reactive to light.   Neck:      Thyroid: No thyromegaly.      Vascular: No JVD.   Cardiovascular:      Rate and Rhythm: Normal " rate and regular rhythm.      Heart sounds: Normal heart sounds.   Pulmonary:      Effort: Pulmonary effort is normal. No retractions.      Breath sounds: Normal breath sounds.   Chest:      Chest wall: No mass, lacerations, deformity, swelling, tenderness, crepitus or edema.   Breasts:     Breasts are symmetrical.   Abdominal:      General: Bowel sounds are normal. There is no distension.      Palpations: Abdomen is soft.      Tenderness: There is no abdominal tenderness. There is no guarding.   Genitourinary:     Comments: deferred  Musculoskeletal:         General: Normal range of motion.      Cervical back: Normal range of motion and neck supple.   Lymphadenopathy:      Cervical: No cervical adenopathy.   Skin:     General: Skin is warm and dry.      Findings: No erythema or rash.   Neurological:      Mental Status: She is alert and oriented to person, place, and time.   Psychiatric:         Behavior: Behavior normal.             Counseling provided on weight management.    Diagnoses and all orders for this visit:    Wellness examination  -     Comprehensive Metabolic Panel; Future  -     Lipid Panel; Future  -     TSH Rfx On Abnormal To Free T4; Future  -     CBC (No Diff); Future    Overweight with body mass index (BMI) 25.0-29.9    Screening for diabetes mellitus  -     Comprehensive Metabolic Panel; Future    Screening for lipid disorders  -     Lipid Panel; Future    Vitamin D deficiency  -     Vitamin D,25-Hydroxy; Future    Screening for thyroid disorder  -     TSH Rfx On Abnormal To Free T4; Future    Screening for deficiency anemia  -     CBC (No Diff); Future    Seasonal allergies  -     fluticasone (Flonase Allergy Relief) 50 MCG/ACT nasal spray; 2 sprays into the nostril(s) as directed by provider Daily.  -     azelastine (ASTELIN) 0.1 % nasal spray; 2 sprays into the nostril(s) as directed by provider 2 (Two) Times a Day.    Eustachian tube dysfunction, bilateral  -     fluticasone (Flonase Allergy  Relief) 50 MCG/ACT nasal spray; 2 sprays into the nostril(s) as directed by provider Daily.  -     azelastine (ASTELIN) 0.1 % nasal spray; 2 sprays into the nostril(s) as directed by provider 2 (Two) Times a Day.    Other orders  -     Discontinue: Progesterone (PROMETRIUM) 100 MG capsule; TAKE 1 CAPSULE BY MOUTH ONCE DAILY AT BEDTIME FOR 90 DAYS (Patient not taking: Reported on 1/19/2024)  -     Discontinue: minoxidil (LONITEN) 2.5 MG tablet; TAKE 1/4 (ONE-FOURTH) TABLET BY MOUTH ONCE DAILY (Patient not taking: Reported on 1/19/2024)  -     Discontinue: loteprednol (LOTEMAX) 0.5 % ophthalmic suspension; INSTILL 1 DROP IN LEFT EYE FOUR TIMES A DAY (Patient not taking: Reported on 1/19/2024)  -     Discontinue: fluticasone (Flonase Allergy Relief) 50 MCG/ACT nasal spray; 2 sprays into the nostril(s) as directed by provider Daily.    The preventative exam has been reviewed in detail.  Counseling/Anticipatory Guidance discussion included discussing nutrition needs, physical activity, healthy weight, injury prevention, misuse of tobacco, alcohol and drugs, sexual behavior, dental health, mental health, immunizations, and needed screenings has been discussed. The patient has been assisted with scheduling healthcare procedures for the coming year and given a written document outlining these recommendations. Age-appropriate screening measures have been ordered for the patient today as indicated above.    Discussed need for weight management.  I recommend they use a weight loss elaine on their phone, eat no more than [id number] bhavna/day, and exercise 30 to 60 minutes 3 times a week.  Discussed weight loss with diet, recommend Weight Watchers or Mediterranean Diet, but stated that whatever method works for this patient is best. The patient agrees with all recommendations at this time. I have discussed a weight loss plan to be determined by this patient.     Will obtain routine labs today and make further recommendations pending  results. All lab results are automatically released to [employer] immediately when they return whether they have been reviewed or not. The patient will be notified about the results, regardless of the findings. If they have not been contacted by the office within 2 weeks after the test has been performed, I want them to contact us to learn about the results.    RV- 1 yr      Star Bynum Radha, APRN   1/19/2024   09:04 EST          Please note that portions of this document were completed with a voice recognition program.     At [facility], we believe that sharing information builds trust and better relationships. You are receiving this note because you are receiving care at [facility] or have recently visited. It is possible you will see health information before a provider has talked with you about it. This kind of information can be easy to misunderstand. To help you fully understand what it means for your health, we urge you to discuss this note with your provider.

## 2024-01-22 DIAGNOSIS — E87.1 HYPONATREMIA: Primary | ICD-10-CM

## 2024-01-29 ENCOUNTER — LAB (OUTPATIENT)
Dept: LAB | Facility: HOSPITAL | Age: 59
End: 2024-01-29
Payer: COMMERCIAL

## 2024-01-29 DIAGNOSIS — E87.1 HYPONATREMIA: ICD-10-CM

## 2024-02-06 ENCOUNTER — LAB (OUTPATIENT)
Dept: LAB | Facility: HOSPITAL | Age: 59
End: 2024-02-06
Payer: COMMERCIAL

## 2024-02-06 DIAGNOSIS — E87.1 HYPONATREMIA: ICD-10-CM

## 2024-02-06 LAB
ANION GAP SERPL CALCULATED.3IONS-SCNC: 9 MMOL/L (ref 5–15)
BUN SERPL-MCNC: 13 MG/DL (ref 6–20)
BUN/CREAT SERPL: 11.8 (ref 7–25)
CALCIUM SPEC-SCNC: 9.3 MG/DL (ref 8.6–10.5)
CHLORIDE SERPL-SCNC: 103 MMOL/L (ref 98–107)
CO2 SERPL-SCNC: 25 MMOL/L (ref 22–29)
CREAT SERPL-MCNC: 1.1 MG/DL (ref 0.57–1)
EGFRCR SERPLBLD CKD-EPI 2021: 58.4 ML/MIN/1.73
GLUCOSE SERPL-MCNC: 72 MG/DL (ref 65–99)
POTASSIUM SERPL-SCNC: 4.2 MMOL/L (ref 3.5–5.2)
SODIUM SERPL-SCNC: 137 MMOL/L (ref 136–145)

## 2024-02-06 PROCEDURE — 80048 BASIC METABOLIC PNL TOTAL CA: CPT

## 2024-02-06 PROCEDURE — 36415 COLL VENOUS BLD VENIPUNCTURE: CPT

## 2024-03-26 DIAGNOSIS — E87.1 HYPONATREMIA: Primary | ICD-10-CM

## 2024-05-16 DIAGNOSIS — E66.09 CLASS 1 OBESITY DUE TO EXCESS CALORIES WITHOUT SERIOUS COMORBIDITY WITH BODY MASS INDEX (BMI) OF 30.0 TO 30.9 IN ADULT: Primary | ICD-10-CM

## 2024-05-16 RX ORDER — SEMAGLUTIDE 0.25 MG/.5ML
0.25 INJECTION, SOLUTION SUBCUTANEOUS WEEKLY
Qty: 2 ML | Refills: 1 | Status: SHIPPED | OUTPATIENT
Start: 2024-05-16

## 2024-05-22 ENCOUNTER — LAB (OUTPATIENT)
Age: 59
End: 2024-05-22
Payer: COMMERCIAL

## 2024-05-22 DIAGNOSIS — E87.1 HYPOSMOLALITY SYNDROME: Primary | ICD-10-CM

## 2024-05-22 DIAGNOSIS — E87.1 HYPONATREMIA: ICD-10-CM

## 2024-05-22 PROCEDURE — 36415 COLL VENOUS BLD VENIPUNCTURE: CPT | Performed by: NURSE PRACTITIONER

## 2024-05-22 PROCEDURE — 80048 BASIC METABOLIC PNL TOTAL CA: CPT | Performed by: NURSE PRACTITIONER

## 2024-05-23 LAB
ANION GAP SERPL CALCULATED.3IONS-SCNC: 10.1 MMOL/L (ref 5–15)
BUN SERPL-MCNC: 15 MG/DL (ref 6–20)
BUN/CREAT SERPL: 14 (ref 7–25)
CALCIUM SPEC-SCNC: 9.3 MG/DL (ref 8.6–10.5)
CHLORIDE SERPL-SCNC: 106 MMOL/L (ref 98–107)
CO2 SERPL-SCNC: 24.9 MMOL/L (ref 22–29)
CREAT SERPL-MCNC: 1.07 MG/DL (ref 0.57–1)
EGFRCR SERPLBLD CKD-EPI 2021: 60.3 ML/MIN/1.73
GLUCOSE SERPL-MCNC: 96 MG/DL (ref 65–99)
POTASSIUM SERPL-SCNC: 4.9 MMOL/L (ref 3.5–5.2)
SODIUM SERPL-SCNC: 141 MMOL/L (ref 136–145)

## 2024-06-07 ENCOUNTER — HOSPITAL ENCOUNTER (OUTPATIENT)
Dept: MAMMOGRAPHY | Facility: HOSPITAL | Age: 59
Discharge: HOME | End: 2024-06-07
Admitting: NURSE PRACTITIONER
Payer: COMMERCIAL

## 2024-06-07 DIAGNOSIS — Z12.31 VISIT FOR SCREENING MAMMOGRAM: ICD-10-CM

## 2024-06-07 PROCEDURE — 77067 SCR MAMMO BI INCL CAD: CPT

## 2024-06-07 PROCEDURE — 77063 BREAST TOMOSYNTHESIS BI: CPT

## 2024-07-26 DIAGNOSIS — E66.09 CLASS 1 OBESITY DUE TO EXCESS CALORIES WITHOUT SERIOUS COMORBIDITY WITH BODY MASS INDEX (BMI) OF 30.0 TO 30.9 IN ADULT: Primary | ICD-10-CM

## 2024-07-26 RX ORDER — SEMAGLUTIDE 0.5 MG/.5ML
0.5 INJECTION, SOLUTION SUBCUTANEOUS WEEKLY
Qty: 2 ML | Refills: 5 | Status: SHIPPED | OUTPATIENT
Start: 2024-07-26

## 2024-08-23 RX ORDER — GENTAMICIN SULFATE 3 MG/ML
1 SOLUTION/ DROPS OPHTHALMIC 4 TIMES DAILY
Qty: 5 ML | Refills: 1 | Status: SHIPPED | OUTPATIENT
Start: 2024-08-23

## 2024-09-24 DIAGNOSIS — E66.09 CLASS 1 OBESITY DUE TO EXCESS CALORIES WITHOUT SERIOUS COMORBIDITY WITH BODY MASS INDEX (BMI) OF 30.0 TO 30.9 IN ADULT: Primary | ICD-10-CM

## 2024-09-24 RX ORDER — SEMAGLUTIDE 1.7 MG/.75ML
1.7 INJECTION, SOLUTION SUBCUTANEOUS WEEKLY
Qty: 3 ML | Refills: 2 | Status: SHIPPED | OUTPATIENT
Start: 2024-09-24

## 2024-09-24 RX ORDER — SEMAGLUTIDE 1 MG/.5ML
1 INJECTION, SOLUTION SUBCUTANEOUS WEEKLY
Qty: 2 ML | Refills: 0 | Status: SHIPPED | OUTPATIENT
Start: 2024-09-24

## 2024-11-05 ENCOUNTER — OFFICE VISIT (OUTPATIENT)
Age: 59
End: 2024-11-05
Payer: COMMERCIAL

## 2024-11-05 VITALS
DIASTOLIC BLOOD PRESSURE: 80 MMHG | WEIGHT: 180 LBS | HEART RATE: 88 BPM | BODY MASS INDEX: 28.93 KG/M2 | RESPIRATION RATE: 18 BRPM | SYSTOLIC BLOOD PRESSURE: 110 MMHG | HEIGHT: 66 IN | OXYGEN SATURATION: 97 %

## 2024-11-05 DIAGNOSIS — M25.511 CHRONIC RIGHT SHOULDER PAIN: Primary | ICD-10-CM

## 2024-11-05 DIAGNOSIS — E66.3 OVERWEIGHT WITH BODY MASS INDEX (BMI) 25.0-29.9: ICD-10-CM

## 2024-11-05 DIAGNOSIS — N18.2 STAGE 2 CHRONIC KIDNEY DISEASE: ICD-10-CM

## 2024-11-05 DIAGNOSIS — G89.29 CHRONIC RIGHT SHOULDER PAIN: Primary | ICD-10-CM

## 2024-11-05 PROCEDURE — 99214 OFFICE O/P EST MOD 30 MIN: CPT | Performed by: NURSE PRACTITIONER

## 2024-11-05 RX ORDER — PREDNISONE 20 MG/1
40 TABLET ORAL DAILY
Qty: 14 TABLET | Refills: 0 | Status: SHIPPED | OUTPATIENT
Start: 2024-11-05

## 2024-11-05 NOTE — PROGRESS NOTES
Chief Complaint   Patient presents with    Shoulder Pain     Right, with range of motion and raising it. Has a dull aching, sharp sensation.         Subjective   Tammi Richardson is a 59 y.o. female    History of Present Illness  Patient in for right shoulder pain, the pain in the right shoulder has been occurring now over a year and sometimes is worse.  Ibuprofen does seem to help. She has not had x-rays or Phy Tx.  She had a fractured clavicle age 2. Weight is up 19 lbs. Since January.  She is on Wegovy 1 mg weekly but is going to go to 1.7 mg at the next prescription.    Allergies   Allergen Reactions    Mobic [Meloxicam] GI Intolerance    Nsaids GI Intolerance     Past Medical History:   Diagnosis Date    Allergic     DDD (degenerative disc disease), lumbar     History of gallstones     Low back pain     Obesity       Past Surgical History:   Procedure Laterality Date    CHOLECYSTECTOMY      COLONOSCOPY      TOE SURGERY Left     Fifth toe     Social History     Socioeconomic History    Marital status: Single   Tobacco Use    Smoking status: Never    Smokeless tobacco: Never   Vaping Use    Vaping status: Never Used   Substance and Sexual Activity    Alcohol use: Yes     Alcohol/week: 1.0 standard drink of alcohol     Types: 1 Glasses of wine per week     Comment: Twice a year    Drug use: No    Sexual activity: Not Currently     Partners: Male     Birth control/protection: Abstinence        Current Outpatient Medications:     azelastine (ASTELIN) 0.1 % nasal spray, 2 sprays into the nostril(s) as directed by provider 2 (Two) Times a Day., Disp: 3 each, Rfl: 3    Calcium Carbonate 1500 (600 Ca) MG tablet, Daily., Disp: , Rfl:     Cholecalciferol (vitamin D3) 125 MCG (5000 UT) tablet tablet, Daily., Disp: , Rfl:     estradiol (MINIVELLE, VIVELLE-DOT) 0.05 MG/24HR patch, , Disp: , Rfl:     fluticasone (Flonase Allergy Relief) 50 MCG/ACT nasal spray, 2 sprays into the nostril(s) as directed by provider Daily.,  Disp: 54.6 mL, Rfl: 3    gentamicin (GARAMYCIN) 0.3 % ophthalmic solution, Apply 1 drop to eye(s) as directed by provider 4 (Four) Times a Day., Disp: 5 mL, Rfl: 1    progesterone (PROMETRIUM) 100 MG capsule, Take 1 capsule by mouth Every Night., Disp: , Rfl:     Semaglutide-Weight Management (Wegovy) 1.7 MG/0.75ML solution auto-injector, Inject 0.75 mL under the skin into the appropriate area as directed 1 (One) Time Per Week. After taking 1 mg weekly for 4 doses, Disp: 3 mL, Rfl: 2    predniSONE (DELTASONE) 20 MG tablet, Take 2 tablets by mouth Daily., Disp: 14 tablet, Rfl: 0     Review of Systems   Constitutional:  Negative for chills, fatigue, fever and unexpected weight change.   Respiratory:  Negative for cough, chest tightness, shortness of breath and wheezing.    Cardiovascular:  Negative for chest pain, palpitations and leg swelling.   Gastrointestinal:  Negative for constipation, diarrhea, nausea and vomiting.   Genitourinary:  Negative for difficulty urinating and dysuria.   Musculoskeletal:  Positive for arthralgias (right shoulder).   Skin:  Negative for color change and rash.   Neurological:  Negative for dizziness, syncope, weakness and headaches.   Psychiatric/Behavioral:  Negative for sleep disturbance.        Objective     Vitals:    11/05/24 0919   BP: 110/80   Pulse: 88   Resp: 18   SpO2: 97%         11/05/24 0919   Weight: 81.6 kg (180 lb)     Body mass index is 29.07 kg/m².  Results for orders placed or performed in visit on 05/22/24   Basic Metabolic Panel    Collection Time: 05/22/24  9:23 AM    Specimen: Arm, Left; Blood   Result Value Ref Range    Glucose 96 65 - 99 mg/dL    BUN 15 6 - 20 mg/dL    Creatinine 1.07 (H) 0.57 - 1.00 mg/dL    Sodium 141 136 - 145 mmol/L    Potassium 4.9 3.5 - 5.2 mmol/L    Chloride 106 98 - 107 mmol/L    CO2 24.9 22.0 - 29.0 mmol/L    Calcium 9.3 8.6 - 10.5 mg/dL    BUN/Creatinine Ratio 14.0 7.0 - 25.0    Anion Gap 10.1 5.0 - 15.0 mmol/L    eGFR 60.3 >60.0  mL/min/1.73       Physical Exam  Vitals reviewed.   Constitutional:       Appearance: She is well-developed.   HENT:      Head: Normocephalic and atraumatic.   Cardiovascular:      Rate and Rhythm: Normal rate and regular rhythm.      Heart sounds: Normal heart sounds.   Pulmonary:      Effort: Pulmonary effort is normal.      Breath sounds: Normal breath sounds.   Musculoskeletal:         General: Tenderness (right shoulder, no crepitis. Full ROM, painful to raise) present.   Skin:     General: Skin is warm and dry.   Neurological:      Mental Status: She is alert and oriented to person, place, and time.   Psychiatric:         Behavior: Behavior normal.         Assessment & Plan   Problems Addressed this Visit          Endocrine and Metabolic    Overweight with body mass index (BMI) 25.0-29.9       Genitourinary and Reproductive     Stage 2 chronic kidney disease     Other Visit Diagnoses       Chronic right shoulder pain    -  Primary    Relevant Medications    predniSONE (DELTASONE) 20 MG tablet    Other Relevant Orders    XR Shoulder 2+ View Right (In Office)          Diagnoses         Codes Comments    Chronic right shoulder pain    -  Primary ICD-10-CM: M25.511, G89.29  ICD-9-CM: 719.41, 338.29     Overweight with body mass index (BMI) 25.0-29.9     ICD-10-CM: E66.3  ICD-9-CM: 278.02     Stage 2 chronic kidney disease     ICD-10-CM: N18.2  ICD-9-CM: 585.2         At the next prescription she will increase the Wegovy to 1.7 mg.  Discussed need for weight management.  I recommend they use a weight loss elaine on their phone, eat no more than 3919-0206 bhavna/day, or use intermittent fasting and exercise 30 to 60 minutes 3 times a week.  Discussed weight loss with diet, recommend Weight Watchers or Mediterranean Diet, but stated that whatever method works for this patient is best. The patient agrees with all recommendations at this time. I have discussed a weight loss plan to be determined by this patient.     For  chronic right shoulder pain we will give her a burst of prednisone and get an x-ray today.  I will make further recommendations pending outcome of this test.    Return visit in as scheduled    Counseling provided on weight management and shoulder pain .    Star Bynum Radha, APRN   11/5/2024   11:01 EST     Please note that portions of this document were completed with a voice recognition program.     At [facility], we believe that sharing information builds trust and better relationships. You are receiving this note because you are receiving care at [facility] or have recently visited. It is possible you will see health information before a provider has talked with you about it. This kind of information can be easy to misunderstand as it is a medical document. It is intended as ntfn-lf-ncyk communication. It is written in medical language and may contain abbreviations or verbiage that are unfamiliar. It may appear blunt or direct. Medical documents are intended to carry relevant information, facts as evident, and the clinical opinion of the practitioner.  To help you fully understand what it means for your health, we urge you to discuss this note with your provider.         Answers submitted by the patient for this visit:  Other (Submitted on 11/4/2024)  Please describe your symptoms.: Pain in right shoulder  Have you had these symptoms before?: Yes  How long have you been having these symptoms?: Greater than 2 weeks  Primary Reason for Visit (Submitted on 11/4/2024)  What is the primary reason for your visit?: Problem Not Listed

## 2024-11-08 DIAGNOSIS — M25.511 CHRONIC RIGHT SHOULDER PAIN: Primary | ICD-10-CM

## 2024-11-08 DIAGNOSIS — G89.29 CHRONIC RIGHT SHOULDER PAIN: Primary | ICD-10-CM

## 2024-11-09 ENCOUNTER — PATIENT MESSAGE (OUTPATIENT)
Age: 59
End: 2024-11-09
Payer: COMMERCIAL

## 2024-11-11 RX ORDER — METRONIDAZOLE 500 MG/1
500 TABLET ORAL 2 TIMES DAILY
Qty: 14 TABLET | Refills: 0 | Status: SHIPPED | OUTPATIENT
Start: 2024-11-11

## 2024-12-03 ENCOUNTER — TREATMENT (OUTPATIENT)
Dept: PHYSICAL THERAPY | Facility: CLINIC | Age: 59
End: 2024-12-03
Payer: COMMERCIAL

## 2024-12-03 DIAGNOSIS — M25.511 CHRONIC RIGHT SHOULDER PAIN: Primary | ICD-10-CM

## 2024-12-03 DIAGNOSIS — G89.29 CHRONIC RIGHT SHOULDER PAIN: Primary | ICD-10-CM

## 2024-12-03 PROCEDURE — 97110 THERAPEUTIC EXERCISES: CPT | Performed by: PHYSICAL THERAPIST

## 2024-12-03 PROCEDURE — 97161 PT EVAL LOW COMPLEX 20 MIN: CPT | Performed by: PHYSICAL THERAPIST

## 2024-12-03 NOTE — PROGRESS NOTES
Physical Therapy Initial Evaluation and Plan of Care  [address]    [place] [address]    Patient: Tammi Richardson   : 1965  Diagnosis/ICD-10 Code:  Chronic right shoulder pain [M25.511, G89.29]  Referring practitioner: ALEJA Garcia  Date of Initial Visit: 12/3/2024  Today's Date: 12/3/2024  Patient seen for 1 session         Visit Diagnoses:    ICD-10-CM ICD-9-CM   1. Chronic right shoulder pain  M25.511 719.41    G89.29 338.29         Subjective Questionnaire: QuickDASH: 25% impairment      Subjective Evaluation    History of Present Illness  Mechanism of injury: Has worked part time at a grocery store, frequent lifting. Developed pn in lateral shoulder about a year and a half ago. Discontinued that work nearly a year ago but pn has persisted. Worst at night, has difficulty sleeping. Minimal pn at rest but increases if lying on her L side, raising her arm any direction, reaching behind her back. Will linger for quite a while after use. Denies popping, clicking. Can experience N/T into tips of 2nd-4th digits when laying flat on her back. Had an XR that was unremarkable. Did course of prednisone which helped significantly at the time but pn seems to be returning. Will manage pn w/ ibuprofen.    Subjective comment: R shoulder pn  Patient Occupation: [employer]heavy computer work; also works part time at [employer] Quality of life: good    Pain  Current pain rating: 3  At best pain ratin  At worst pain rating: 10    Social Support  Lives with: alone    Hand dominance: right    Diagnostic Tests  X-ray: normal    Treatments  Previous treatment: medication  Patient Goals  Patient goals for therapy: decreased pain, increased motion, increased strength, independence with ADLs/IADLs and return to sport/leisure activities           Treatment  See Exercise, Manual, and Modality Logs for complete treatment.     Access Code: [email or web address]  URL:  [email or web address]  Date: 12/03/2024  Prepared by: Kat Kiran    Exercises  - Supine Thoracic Mobilization Towel Roll Vertical  - 1-2 x daily - 10 reps - 5-10 sec hold  - Sidelying Shoulder External Rotation AROM  - 1-2 x daily - 15-30 reps  - Sidelying Shoulder Horizontal Abduction  - 1-2 x daily - 15-30 reps  - Single Arm Doorway Pec Stretch at 120 Degrees Abduction  - 1-2 x daily - 3 reps - 20 sec hold    Objective          Postural Observations    Additional Postural Observation Details  R shoulder protracted, anterior scapular tilt    Tenderness     Right Shoulder  No tenderness     Active Range of Motion   Left Shoulder   Flexion: 155 degrees   Abduction: 170 degrees   External rotation 0°: 75 degrees   External rotation BTH: T2   Internal rotation BTB: Active internal rotation behind the back: contralateral PSIS.     Right Shoulder   Flexion: 155 degrees   Abduction: 175 degrees   External rotation 0°: 90 degrees   External rotation BTH: T2   Internal rotation BTB: T8     Additional Active Range of Motion Details  End range lateral shoulder pn w/ flxn/abd/IR BTB    Strength/Myotome Testing     Right Shoulder     Planes of Motion   Flexion: 4   Abduction: 4   External rotation at 0°: 5   Internal rotation at 0°: 5     Isolated Muscles   Lower trapezius: 4-   Middle trapezius: 4-   Supraspinatus: 5     Additional Strength Details  Pn w/ resisted flxn, abd    Tests   Cervical     Right   Negative active compression ([place]).     Right Shoulder   Positive full can and Hawkin's.   Negative belly press, clunk, crank, crossover, drop arm, external rotation lag sign, internal rotation lag sign and Speed's.           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 Findings consistent w/ rotator cuff tendinopathy. Primary deficits include painful and limited shoulder mobility, altered scap mechanics, pec tightness. (+) pn and weakness w/ resisted shoulder elevation, no pn w/ resisted rotation. (+) HK, Neer. (-) lag signs. Symptoms limit tolerance to lifting, carrying, reaching, dressing/bathing, and disturb sleep. Good tolerance to initial exercise in clinic today. Pt would benefit from skilled PT services to address deficits and allow return to full work and daily activities w/o pn or limitation.  Barriers to therapy: n/a  Prognosis: good    Goals  Plan Goals: STG 4wks  1) Pt to be compliant w/ initial HEP for ROM, strength, and symptom mgmt.  2) Pt to report at least a 40% improvement in symptoms.  3) Pt to improve R shoulder/scapular strength to 4+/5 or better w/ min to no pn on testing.    LTG 8 wks  1) Pt to be independent w/ long term HEP and self mgmt.  2) Pt to report at least a 80% improvement in symptoms.  3) Pt to improve shoulder/scapular strength to 5/5 in planes w/ min to no pn on testing.  4) Pt to improve QD score to 15% impairment or better to reflect improved pn and function.   5) Pt to report no sleep disruption resulting from R shoulder symptoms.      Plan  Therapy options: will be seen for skilled therapy services  Planned modality interventions: TENS, cryotherapy, thermotherapy (hydrocollator packs), ultrasound and dry needling  Planned therapy interventions: flexibility, functional ROM exercises, home exercise program, joint mobilization, manual therapy, neuromuscular re-education, postural training, soft tissue mobilization, strengthening, stretching and therapeutic  activities  Frequency: 2x week  Duration in weeks: 12  Treatment plan discussed with: patient  Plan details: PT POC to emphasize restoration of pn free shoulder mobility, scapular and shoulder strengthening, dynamic stabilization, and postural awareness utillizing TE/TA/NMR/MT, modalities as indicated for pn control        History # of Personal Factors and/or Comorbidities: LOW (0)  Examination of Body System(s): # of elements: LOW (1-2)  Clinical Presentation: EVOLVING  Clinical Decision Making: LOW       Timed:         Manual Therapy:    0     mins  72854;     Therapeutic Exercise:    20     mins  40892;     Neuromuscular Frantz:    0    mins  00317;    Therapeutic Activity:     0     mins  59069;     Gait Trainin     mins  64107;     Ultrasound:     0     mins  96291;    Ionto                               0    mins   32676  Self Care                       0     mins   67678  Work Conditioning 1-2 hours    0    mins 40201  Work Conditioning ea add'l hour    0   mins 86104      Un-Timed:  Electrical Stimulation:    0     mins  07620 ([facility] );  Dry Needling     0     mins self-pay  Traction     0     mins 09034  Low Eval     30     Mins 71260  Mod Eval     0     Mins 59461  High Eval                       0     Mins 03608  Re-Eval     0     Mins 93719  Canalith Repos    0     mins 51360      Timed Treatment:   20   mins   Total Treatment:     50   mins          PT: Kat Kiran PT     License Number: [id number]  Electronically signed by Kat Kiran PT, 24, 8:07 AM EST    Certification Period: 12/3/2024 thru 3/2/2025  I certify that the therapy services are furnished while this patient is under my care.  The services outlined above are required by this patient, and will be reviewed every 90 days.         Physician Signature:__________________________________________________    PHYSICIAN: Star Garcia APRN  NPI: [id number]                                      DATE:

## 2024-12-05 ENCOUNTER — TREATMENT (OUTPATIENT)
Dept: PHYSICAL THERAPY | Facility: CLINIC | Age: 59
End: 2024-12-05
Payer: COMMERCIAL

## 2024-12-05 DIAGNOSIS — M25.511 CHRONIC RIGHT SHOULDER PAIN: Primary | ICD-10-CM

## 2024-12-05 DIAGNOSIS — G89.29 CHRONIC RIGHT SHOULDER PAIN: Primary | ICD-10-CM

## 2024-12-05 NOTE — PROGRESS NOTES
Physical Therapy Daily Treatment Note                  [address]  [place] [address]      Patient: Tammi Richardson   : 1965  Diagnosis/ICD-10 Code:  Chronic right shoulder pain [M25.511, G89.29]  Referring practitioner: ALEJA Garcia  Date of Initial Visit: Type: THERAPY  Noted: 12/3/2024  Today's Date: 2024  Patient seen for 2 sessions             Subjective   Tammi Richardson reports: seeing some improvements in the shoulder pain and tightness. Doing the exercises on both sides and the left is improving in ROM too.     Objective          Active Range of Motion   Left Shoulder   Internal rotation BTB: T8     Right Shoulder   Internal rotation BTB: T12       See Exercise, Manual, and Modality Logs for complete treatment.       Assessment/Plan  Went over HEP with any questions she had. Added resistant band activities with positive report. Pt would like to start strengthening at home and had good demonstration. Added exercises below to HEP.     Access Code: [email or web address]  URL: [email or web address]  Date: 2024  Prepared by: Kat Leos    Exercises  - Seated Single Arm Shoulder Horizontal Abduction with Self-Anchored Resistance  - 1 x daily - 7 x weekly - 2 sets - 10 reps  - Shoulder External Rotation and Scapular Retraction with Resistance  - 1 x daily - 7 x weekly - 1 sets - 15 reps  - Scaption Wall Slide with Towel  - 1 x daily - 7 x weekly - 1 sets - 15 reps  Progress per Plan of Care and Progress strengthening /stabilization /functional activity           Timed:  Manual Therapy:         mins  22359;  Therapeutic Exercise:    15     mins  45393;     Neuromuscular Frantz:   10    mins  27284;    Therapeutic Activity:     8     mins  47466;     Gait Training:           mins  63674;     Ultrasound:          mins  88304;    Electrical Stimulation:         mins  04075;  Iontophoresis          mins  63966;  Work Conditioning 1-2 hr  ___ mins 31223;  Work Conditioning ea additional hr ___ mins 33764    Untimed:  Electrical Stimulation:         mins  15102 ( );  Mechanical Traction:         mins  57011;   Fluidotherapy          mins  50120    Timed Treatment:   33   mins   Total Treatment:     33   mins        Kat Leos PTA  Physical Therapist Assistant

## 2024-12-10 ENCOUNTER — PATIENT MESSAGE (OUTPATIENT)
Age: 59
End: 2024-12-10
Payer: COMMERCIAL

## 2024-12-10 DIAGNOSIS — E66.3 OVERWEIGHT WITH BODY MASS INDEX (BMI) 25.0-29.9: Primary | ICD-10-CM

## 2024-12-10 RX ORDER — SEMAGLUTIDE 2.4 MG/.75ML
2.4 INJECTION, SOLUTION SUBCUTANEOUS WEEKLY
Qty: 3 ML | Refills: 5 | Status: SHIPPED | OUTPATIENT
Start: 2024-12-10

## 2024-12-12 ENCOUNTER — TREATMENT (OUTPATIENT)
Dept: PHYSICAL THERAPY | Facility: CLINIC | Age: 59
End: 2024-12-12
Payer: COMMERCIAL

## 2024-12-12 DIAGNOSIS — G89.29 CHRONIC RIGHT SHOULDER PAIN: Primary | ICD-10-CM

## 2024-12-12 DIAGNOSIS — M25.511 CHRONIC RIGHT SHOULDER PAIN: Primary | ICD-10-CM

## 2024-12-12 PROCEDURE — 97140 MANUAL THERAPY 1/> REGIONS: CPT | Performed by: PHYSICAL THERAPIST

## 2024-12-12 PROCEDURE — 97110 THERAPEUTIC EXERCISES: CPT | Performed by: PHYSICAL THERAPIST

## 2024-12-12 PROCEDURE — 97112 NEUROMUSCULAR REEDUCATION: CPT | Performed by: PHYSICAL THERAPIST

## 2024-12-12 NOTE — PROGRESS NOTES
Physical Therapy Daily Treatment Note                  [address] [facility] [address]  [place] [address]      Patient: Tammi Richardson   : 1965  Diagnosis/ICD-10 Code:  Chronic right shoulder pain [M25.511, G89.29]  Referring practitioner: ALEJA Garcia  Date of Initial Visit: Type: THERAPY  Noted: 12/3/2024  Today's Date: 2024  Patient seen for 3 sessions             Subjective   Tammi Richardson reports: 6/10 pain this morning. Felt fine for most of the week but woke up with a lot of pain after sleeping. She has to stay on her back at night and it seems like the right shoulder gets aggravated from this.     Objective   Minimal hypomobility in the R GH joint.  See Exercise, Manual, and Modality Logs for complete treatment.       Assessment/Plan  Able to complete joint mobs to decrease resistance in the capsule. Tammi responded well to exercises program with a focus on stabilization and strengthening the R shoulder complex. Decrease significantly in reaching overhead post treatment. She will continue to benefit from OH strengthening and RTC strengthening.   Progress per Plan of Care and Progress strengthening /stabilization /functional activity           Timed:  Manual Therapy:    8     mins  30964;  Therapeutic Exercise:    16     mins  93837;     Neuromuscular Frantz:   15    mins  54572;    Therapeutic Activity:          mins  47438;     Gait Training:           mins  58572;     Ultrasound:          mins  06886;    Electrical Stimulation:         mins  85791;  Iontophoresis          mins  17614;  Work Conditioning 1-2 hr ___ mins 59116;  Work Conditioning ea additional hr ___ mins 72239    Untimed:  Electrical Stimulation:         mins  86036 ( );  Mechanical Traction:         mins  01390;   Fluidotherapy          mins  92624    Timed Treatment:   39   mins   Total Treatment:     39   mins        Kat Leos PTA  Physical Therapist Assistant

## 2024-12-17 ENCOUNTER — TREATMENT (OUTPATIENT)
Dept: PHYSICAL THERAPY | Facility: CLINIC | Age: 59
End: 2024-12-17
Payer: COMMERCIAL

## 2024-12-17 DIAGNOSIS — G89.29 CHRONIC RIGHT SHOULDER PAIN: Primary | ICD-10-CM

## 2024-12-17 DIAGNOSIS — M25.511 CHRONIC RIGHT SHOULDER PAIN: Primary | ICD-10-CM

## 2024-12-17 PROCEDURE — 97110 THERAPEUTIC EXERCISES: CPT | Performed by: PHYSICAL THERAPIST

## 2024-12-17 PROCEDURE — 97112 NEUROMUSCULAR REEDUCATION: CPT | Performed by: PHYSICAL THERAPIST

## 2024-12-17 NOTE — PROGRESS NOTES
Physical Therapy Daily Treatment Note                  [address] [facility] [facility] [address]  [place] [address]      Patient: Tammi Richardson   : 1965  Diagnosis/ICD-10 Code:  Chronic right shoulder pain [M25.511, G89.29]  Referring practitioner: ALEJA Garcia  Date of Initial Visit: Type: THERAPY  Noted: 12/3/2024  Today's Date: 2024  Patient seen for 4 sessions             Subjective   Tammi Richardson reports: woke up with more pain than normal. Haven't been able to get the exercises in much this past week.     Objective   See Exercise, Manual, and Modality Logs for complete treatment.       Assessment/Plan  Responding well to the strengthening and stabilization exercises of the right shoulder. She continues to gain relief post exercises. She has been unable to complete HEP recently and encouraged to start back on updated HEP that involves TB. Progressed exercises to include more   Progress per Plan of Care and Progress strengthening /stabilization /functional activity           Timed:  Manual Therapy:         mins  59445;  Therapeutic Exercise:    24    mins  45737;     Neuromuscular Frantz:   15    mins  98657;    Therapeutic Activity:          mins  25461;     Gait Training:           mins  87826;     Ultrasound:          mins  46347;    Electrical Stimulation:         mins  36729;  Iontophoresis          mins  85941;  Work Conditioning 1-2 hr ___ mins 68752;  Work Conditioning ea additional hr ___ mins 13625    Untimed:  Electrical Stimulation:         mins  56031 ( );  Mechanical Traction:         mins  44986;   Fluidotherapy          mins  23151    Timed Treatment:   39   mins   Total Treatment:     39   mins        Kat Leos PTA  Physical Therapist Assistant

## 2024-12-20 ENCOUNTER — TREATMENT (OUTPATIENT)
Dept: PHYSICAL THERAPY | Facility: CLINIC | Age: 59
End: 2024-12-20
Payer: COMMERCIAL

## 2024-12-20 DIAGNOSIS — G89.29 CHRONIC RIGHT SHOULDER PAIN: Primary | ICD-10-CM

## 2024-12-20 DIAGNOSIS — M25.511 CHRONIC RIGHT SHOULDER PAIN: Primary | ICD-10-CM

## 2024-12-20 PROCEDURE — 97530 THERAPEUTIC ACTIVITIES: CPT | Performed by: PHYSICAL THERAPIST

## 2024-12-20 PROCEDURE — 97112 NEUROMUSCULAR REEDUCATION: CPT | Performed by: PHYSICAL THERAPIST

## 2024-12-20 PROCEDURE — 97110 THERAPEUTIC EXERCISES: CPT | Performed by: PHYSICAL THERAPIST

## 2024-12-20 NOTE — PROGRESS NOTES
Physical Therapy Daily Treatment Note  [address] [facility]  [address]   [place] [address]      Patient: Tammi Rihcardson   : 1965  Referring practitioner: ALEJA Garcia  Date of Initial Visit: Type: THERAPY  Noted: 12/3/2024  Today's Date: 2024  Patient seen for 5 sessions       Visit Diagnoses:    ICD-10-CM ICD-9-CM   1. Chronic right shoulder pain  M25.511 719.41    G89.29 338.29       Subjective   Tammi Richardson reports: R shoulder still throbbing at night, woke her at 3am and has been awake since. Can move her R shoulder well but painful to reach far.    Pre tx pn score: 0  Post tx pn score: 0    Objective   See Exercise, Manual, and Modality Logs for complete treatment.       Assessment & Plan       Assessment  Assessment details: Cont to report end range pn all planes in the R shoulder, but ROM remains functional. Painful to attempt pec stretches in supine today, but tolerated well when performed unilaterally at doorway. Continued w/ light cuff/scapular strengthening as outlined in chart. No pn reported afterward.    Plan  Plan details: Cont to progress strengthening as pn allows          Timed:         Manual Therapy:    0     mins  08867;     Therapeutic Exercise:    25     mins  67185;     Neuromuscular Frantz:    15    mins  18470;    Therapeutic Activity:     10     mins  05168;     Gait Trainin     mins  07599;     Ultrasound:     0     mins  52446;    Ionto                               0    mins   88418  Self Care                       0     mins   32317  Work Conditioning 1-2 hours    0    mins 13459  Work Conditioning ea add'l hour    0   mins 79134      Un-Timed:  Electrical Stimulation:    0     mins  59964 ( );  Dry Needling     0     mins self-pay  Traction     0     mins 44488  Canalith Repos    0     mins 60265    Timed Treatment:   50   mins   Total Treatment:     50   mins    Kat Kiran, PT  [place] License: [id number]

## 2024-12-23 ENCOUNTER — TREATMENT (OUTPATIENT)
Dept: PHYSICAL THERAPY | Facility: CLINIC | Age: 59
End: 2024-12-23
Payer: COMMERCIAL

## 2024-12-23 DIAGNOSIS — G89.29 CHRONIC RIGHT SHOULDER PAIN: Primary | ICD-10-CM

## 2024-12-23 DIAGNOSIS — M25.511 CHRONIC RIGHT SHOULDER PAIN: Primary | ICD-10-CM

## 2024-12-23 PROCEDURE — 97112 NEUROMUSCULAR REEDUCATION: CPT | Performed by: PHYSICAL THERAPIST

## 2024-12-23 PROCEDURE — 97530 THERAPEUTIC ACTIVITIES: CPT | Performed by: PHYSICAL THERAPIST

## 2024-12-23 PROCEDURE — 97110 THERAPEUTIC EXERCISES: CPT | Performed by: PHYSICAL THERAPIST

## 2024-12-23 NOTE — PROGRESS NOTES
Physical Therapy Daily Treatment Note  [address] [facility]  [address]   [place] [address]      Patient: Tammi Richardson   : 1965  Referring practitioner: ALEJA Garcia  Date of Initial Visit: Type: THERAPY  Noted: 12/3/2024  Today's Date: 2024  Patient seen for 6 sessions       Visit Diagnoses:    ICD-10-CM ICD-9-CM   1. Chronic right shoulder pain  M25.511 719.41    G89.29 338.29       Subjective   Tammi Richardson reports: has slept better the past few nights. was able to reach up behind her back to don/doff her bra, had been very painful up to this point.    Pre tx pn score: 2-L shoulder  Post tx pn score: 0    Objective   See Exercise, Manual, and Modality Logs for complete treatment.     B shoulder IR BTB to T8  (-) pn active shoulder mobility on R, end range pn all planes on L    Assessment & Plan       Assessment  Assessment details: Min pn complaints at start of visit today. Sleep improved since last visit and pt demo IR BTB restored bilaterally. Still elicits moderate L shoulder pn, primarily lateral upper arm near cuff insertion. Able to inc reps w/ several exercises and add resisted band IR/ER w/o complaint. Needs ongoing PT to further progress cuff strength and enhance scapular mechanics.    Plan  Plan details: Cont to progress per current POC          Timed:         Manual Therapy:    0     mins  38211;     Therapeutic Exercise:    25     mins  75705;     Neuromuscular Frantz:    13    mins  95920;    Therapeutic Activity:     10     mins  16237;     Gait Trainin     mins  85158;     Ultrasound:     0     mins  61159;    Ionto                               0    mins   43112  Self Care                       0     mins   63063  Work Conditioning 1-2 hours    0    mins 44347  Work Conditioning ea add'l hour    0   mins 83744      Un-Timed:  Electrical Stimulation:    0     mins  26061 ( );  Dry Needling     0     mins self-pay  Traction     0     mins  50567  [facility]    0     mins 95375    Timed Treatment:   48   mins   Total Treatment:     48   mins    Kat Kiran, PT  [place] License: [id number]

## 2024-12-27 ENCOUNTER — TREATMENT (OUTPATIENT)
Dept: PHYSICAL THERAPY | Facility: CLINIC | Age: 59
End: 2024-12-27
Payer: COMMERCIAL

## 2024-12-27 DIAGNOSIS — M25.511 CHRONIC RIGHT SHOULDER PAIN: Primary | ICD-10-CM

## 2024-12-27 DIAGNOSIS — G89.29 CHRONIC RIGHT SHOULDER PAIN: Primary | ICD-10-CM

## 2024-12-27 PROCEDURE — 97112 NEUROMUSCULAR REEDUCATION: CPT | Performed by: PHYSICAL THERAPIST

## 2024-12-27 PROCEDURE — 97110 THERAPEUTIC EXERCISES: CPT | Performed by: PHYSICAL THERAPIST

## 2024-12-27 PROCEDURE — 97140 MANUAL THERAPY 1/> REGIONS: CPT | Performed by: PHYSICAL THERAPIST

## 2024-12-27 NOTE — PROGRESS NOTES
Physical Therapy Daily Treatment Note                  [address] [facility] [address]  [place] [address]      Patient: Tammi Richardson   : 1965  Diagnosis/ICD-10 Code:  Chronic right shoulder pain [M25.511, G89.29]  Referring practitioner: ALEJA Garcia  Date of Initial Visit: Type: THERAPY  Noted: 12/3/2024  Today's Date: 2024  Patient seen for 7 sessions             Subjective   Tammi Richardson reports: L has been bothering her more than the R lately, especially when reaching for seat belt. Waking up has been better and not having throbbing pain in the R. Not taking any medications and responding well to exercising.     Objective          Scapular Mobility   Left Shoulder   Scapular mobility: fair    Right Shoulder   Scapular mobility: good    Joint Play   Left Shoulder  Hypomobile in the anterior capsule and inferior capsule.    Right Shoulder  Joints within functional limits are the anterior capsule and inferior capsule.         See Exercise, Manual, and Modality Logs for complete treatment.       Assessment/Plan  Pt noted pain in the shoulder with reaching up to the side and behind her to pull seat belt down. Able to abolish this pain with MT and performed simulation with minimal pain. Pt continues to benefit from PT intervention at this time to decrease pain and improve daily use without dysfunction.   Progress per Plan of Care and Progress strengthening /stabilization /functional activity           Timed:  Manual Therapy:    8     mins  02856;  Therapeutic Exercise:    27     mins  03494;     Neuromuscular Frantz:   10    mins  79197;    Therapeutic Activity:          mins  62617;     Gait Training:           mins  93196;     Ultrasound:          mins  04473;    Electrical Stimulation:         mins  18743;  Iontophoresis          mins  50363;  Work Conditioning 1-2 hr ___ mins 44239;  Work Conditioning ea additional hr ___ mins  85607    Untimed:  Electrical Stimulation:         mins  23271 ( );  Mechanical Traction:         mins  22928;   Fluidotherapy          mins  18058    Timed Treatment:   45   mins   Total Treatment:     45   mins        Kat Leos PTA  Physical Therapist Assistant

## 2024-12-30 ENCOUNTER — TREATMENT (OUTPATIENT)
Dept: PHYSICAL THERAPY | Facility: CLINIC | Age: 59
End: 2024-12-30
Payer: COMMERCIAL

## 2024-12-30 DIAGNOSIS — G89.29 CHRONIC RIGHT SHOULDER PAIN: Primary | ICD-10-CM

## 2024-12-30 DIAGNOSIS — M25.511 CHRONIC RIGHT SHOULDER PAIN: Primary | ICD-10-CM

## 2024-12-30 PROCEDURE — 97112 NEUROMUSCULAR REEDUCATION: CPT | Performed by: PHYSICAL THERAPIST

## 2024-12-30 PROCEDURE — 97110 THERAPEUTIC EXERCISES: CPT | Performed by: PHYSICAL THERAPIST

## 2024-12-30 PROCEDURE — 97140 MANUAL THERAPY 1/> REGIONS: CPT | Performed by: PHYSICAL THERAPIST

## 2024-12-30 NOTE — PROGRESS NOTES
Physical Therapy Daily Treatment Note                  [address] [facility] [address]  [place] [address]      Patient: Tammi Richardson   : 1965  Diagnosis/ICD-10 Code:  Chronic right shoulder pain [M25.511, G89.29]  Referring practitioner: ALEJA Garcia  Date of Initial Visit: Type: THERAPY  Noted: 12/3/2024  Today's Date: 2024  Patient seen for 8 sessions             Subjective   Tammi Richardson reports: reaching the seatbelt is a lot better but still feel restricted. The throbbing has not come back in the right shoulder.     0/10 pain pre and post treatment    Objective          Active Range of Motion   Left Shoulder   External rotation 0°: 90 degrees     Right Shoulder   External rotation 0°: 90 degrees       See Exercise, Manual, and Modality Logs for complete treatment.       Assessment/Plan  Improved reaching behind her for seatbelt with no discomfort and minimal restriction compared to last week. Increased band to red during TB pull apart due to ease. No discomfort with wall angels today compared to last visit needing slight modification. Progressing well and will continue as able with light RTC and OH strengthening.   Progress per Plan of Care and Progress strengthening /stabilization /functional activity           Timed:  Manual Therapy:    8     mins  98687;  Therapeutic Exercise:    26     mins  38731;     Neuromuscular Frantz:    8    mins  64959;    Therapeutic Activity:          mins  20041;     Gait Training:           mins  11695;     Ultrasound:          mins  03034;    Electrical Stimulation:         mins  40123;  Iontophoresis          mins  01841;  Work Conditioning 1-2 hr ___ mins 75661;  Work Conditioning ea additional hr ___ mins 39246    Untimed:  Electrical Stimulation:         mins  38347 ( );  Mechanical Traction:         mins  99945;   Fluidotherapy          mins  14862    Timed Treatment:   42   mins   Total Treatment:     42    mins        Kat Leos, PTA  Physical Therapist Assistant

## 2025-01-03 ENCOUNTER — TREATMENT (OUTPATIENT)
Dept: PHYSICAL THERAPY | Facility: CLINIC | Age: 60
End: 2025-01-03
Payer: COMMERCIAL

## 2025-01-03 DIAGNOSIS — G89.29 CHRONIC RIGHT SHOULDER PAIN: Primary | ICD-10-CM

## 2025-01-03 DIAGNOSIS — M25.511 CHRONIC RIGHT SHOULDER PAIN: Primary | ICD-10-CM

## 2025-01-03 PROCEDURE — 97530 THERAPEUTIC ACTIVITIES: CPT | Performed by: PHYSICAL THERAPIST

## 2025-01-03 PROCEDURE — 97112 NEUROMUSCULAR REEDUCATION: CPT | Performed by: PHYSICAL THERAPIST

## 2025-01-03 PROCEDURE — 97110 THERAPEUTIC EXERCISES: CPT | Performed by: PHYSICAL THERAPIST

## 2025-01-03 NOTE — PROGRESS NOTES
Physical Therapy Reassessment  [address] [facility] [address]    [place] [address]  Patient: Tammi Richardson   : 1965  Diagnosis/ICD-10 Code:  Chronic right shoulder pain [M25.511, G89.29]  Referring practitioner: ALEJA Garcia  Date of Initial Visit: 1/3/2025  Today's Date: 1/3/2025  Patient seen for 9 sessions         Visit Diagnoses:    ICD-10-CM ICD-9-CM   1. Chronic right shoulder pain  M25.511 719.41    G89.29 338.29       Subjective Questionnaire: QuickDASH: 18.18% impairment  Clinical Progress: improved  Home Program Compliance: Yes  Treatment has included: therapeutic exercise, neuromuscular re-education, manual therapy, and therapeutic activity      Subjective   Tammi Richardson reports: Rates symptoms as 90% improved overall. R shoulder pn mostly present when lying on her L side or on her back at night, will wake with pn. Otherwise has not had much pn during the day. N/T in arm has subsided. L shoulder also seems to have improved. Was having difficulty reaching back to get her seatbelt w/ her L arm, seems to be improving as well.     Pre tx pn score: 0  Post tx pn score: 0    Treatment  See Exercise, Manual, and Modality Logs for complete treatment.     Objective          Postural Observations    Additional Postural Observation Details  R shoulder protracted, anterior scapular tilt    Tenderness     Right Shoulder  No tenderness     Active Range of Motion   Left Shoulder   Flexion: 165 degrees   Abduction: 180 degrees   External rotation 0°: 88 degrees   External rotation BTH: T3   Internal rotation BTB: Active internal rotation behind the back: contralateral PSIS.     Right Shoulder   Flexion: 162 degrees   Abduction: 180 degrees   External rotation 0°: 90 degrees   External rotation BTH: T3   Internal rotation BTB: T8     Additional Active Range of Motion Details  End range lateral shoulder pn w/ flxn/abd/IR BTB    Strength/Myotome Testing     Left Shoulder     Planes of Motion    Flexion: 5   Abduction: 5   External rotation at 0°: 4+   Internal rotation at 0°: 4+     Isolated Muscles   Lower trapezius: 5   Middle trapezius: 4   Serratus anterior: 5     Right Shoulder     Planes of Motion   Flexion: 5   Abduction: 5   External rotation at 0°: 5   Internal rotation at 0°: 5     Isolated Muscles   Lower trapezius: 4   Middle trapezius: 4   Serratus anterior: 5   Supraspinatus: 5     Additional Strength Details  No significant discomfort w/ RUE MMT  Mod L shoulder pn w/ resisted IR/ER    Tests   Cervical     Right   Negative active compression (Collier).     Right Shoulder   Positive full can and Hawkin's.   Negative belly press, clunk, crank, crossover, drop arm, external rotation lag sign, internal rotation lag sign and Speed's.             Assessment & Plan       Assessment  Assessment details: Reassessment performed. Pt has completed 9 PT visits. She reports significant reduction in B shoulder pn. R shoulder now only symptomatic at night when trying to sleep. Does not limit daily activity. R shoulder mobility WNL and minimally painful, strength improved. Demo ongoing weakness throughout scapular stabilizers. L shoulder remains more painful than R, causes difficulty reaching for seatbelt, reaching behind her back. Weak and painful w/ resisted IR/ER and w/ testing of scapular stabilizers. Overall pt making great progress toward PT goals and rates symptoms as approx 90% improved. She would like to trial independent mgmt, which I think is appropriate given her progress.    Goals  Plan Goals: STG 4wks  1) Pt to be compliant w/ initial HEP for ROM, strength, and symptom mgmt.-MET  2) Pt to report at least a 40% improvement in symptoms.-MET  3) Pt to improve R shoulder/scapular strength to 4+/5 or better w/ min to no pn on testing.-PROGRESSING    LTG 8 wks  1) Pt to be independent w/ long term HEP and self mgmt.-PROGRESSING  2) Pt to report at least a 80% improvement in symptoms.-MET  3) Pt to  improve shoulder/scapular strength to 5/5 in planes w/ min to no pn on testing.-PARTIALLY MET  4) Pt to improve QD score to 15% impairment or better to reflect improved pn and function. -PROGRESSING  5) Pt to report no sleep disruption resulting from R shoulder symptoms.-PROGRESSING    Plan  Plan details: Trial independent mgmt, return to clinic as needed        Progress toward previous goals: Partially Met        Timed:         Manual Therapy:    0     mins  65292;     Therapeutic Exercise:    10     mins  33381;     Neuromuscular Frantz:    12    mins  44867;    Therapeutic Activity:     25     mins  10901;     Gait Trainin     mins  57527;     Ultrasound:     0     mins  26812;    Ionto                               0    mins   70394  Self Care                       0     mins   21217  Work Conditioning 1-2 hours    0    mins 10293  Work Conditioning ea add'l hour    0   mins 44970      Un-Timed:  Electrical Stimulation:    0     mins  97448 ( );  Dry Needling     0     mins self-pay  Traction     0     mins 91575  Canalith Repos    0     mins 14842  Re-eval     0    mins 52285      Timed Treatment:   47   mins   Total Treatment:     47   mins          PT: Kat Kiran PT     [place] License: #[id number]    Electronically signed by Kat Kiran, PT, 25, 7:58 AM EST

## 2025-01-22 ENCOUNTER — LAB (OUTPATIENT)
Age: 60
End: 2025-01-22
Payer: COMMERCIAL

## 2025-01-22 ENCOUNTER — OFFICE VISIT (OUTPATIENT)
Age: 60
End: 2025-01-22
Payer: COMMERCIAL

## 2025-01-22 VITALS
SYSTOLIC BLOOD PRESSURE: 118 MMHG | BODY MASS INDEX: 27.8 KG/M2 | OXYGEN SATURATION: 97 % | DIASTOLIC BLOOD PRESSURE: 72 MMHG | RESPIRATION RATE: 18 BRPM | HEIGHT: 66 IN | WEIGHT: 173 LBS | HEART RATE: 83 BPM

## 2025-01-22 DIAGNOSIS — Z13.29 SCREENING FOR THYROID DISORDER: ICD-10-CM

## 2025-01-22 DIAGNOSIS — H69.93 EUSTACHIAN TUBE DYSFUNCTION, BILATERAL: ICD-10-CM

## 2025-01-22 DIAGNOSIS — J30.2 SEASONAL ALLERGIES: ICD-10-CM

## 2025-01-22 DIAGNOSIS — Z13.0 SCREENING FOR DEFICIENCY ANEMIA: ICD-10-CM

## 2025-01-22 DIAGNOSIS — N18.2 STAGE 2 CHRONIC KIDNEY DISEASE: ICD-10-CM

## 2025-01-22 DIAGNOSIS — Z00.00 WELLNESS EXAMINATION: Primary | ICD-10-CM

## 2025-01-22 DIAGNOSIS — Z00.00 WELLNESS EXAMINATION: ICD-10-CM

## 2025-01-22 DIAGNOSIS — E78.5 DYSLIPIDEMIA: ICD-10-CM

## 2025-01-22 LAB
DEPRECATED RDW RBC AUTO: 41.9 FL (ref 37–54)
ERYTHROCYTE [DISTWIDTH] IN BLOOD BY AUTOMATED COUNT: 11.9 % (ref 12.3–15.4)
HCT VFR BLD AUTO: 43.5 % (ref 34–46.6)
HGB BLD-MCNC: 14 G/DL (ref 12–15.9)
MCH RBC QN AUTO: 30.8 PG (ref 26.6–33)
MCHC RBC AUTO-ENTMCNC: 32.2 G/DL (ref 31.5–35.7)
MCV RBC AUTO: 95.6 FL (ref 79–97)
PLATELET # BLD AUTO: 292 10*3/MM3 (ref 140–450)
PMV BLD AUTO: 10.7 FL (ref 6–12)
RBC # BLD AUTO: 4.55 10*6/MM3 (ref 3.77–5.28)
WBC NRBC COR # BLD AUTO: 7.52 10*3/MM3 (ref 3.4–10.8)

## 2025-01-22 PROCEDURE — 36415 COLL VENOUS BLD VENIPUNCTURE: CPT | Performed by: NURSE PRACTITIONER

## 2025-01-22 PROCEDURE — 80050 GENERAL HEALTH PANEL: CPT | Performed by: NURSE PRACTITIONER

## 2025-01-22 PROCEDURE — 80061 LIPID PANEL: CPT | Performed by: NURSE PRACTITIONER

## 2025-01-22 PROCEDURE — 99396 PREV VISIT EST AGE 40-64: CPT | Performed by: NURSE PRACTITIONER

## 2025-01-22 RX ORDER — AZELASTINE 1 MG/ML
2 SPRAY, METERED NASAL 2 TIMES DAILY
Qty: 3 EACH | Refills: 3 | Status: SHIPPED | OUTPATIENT
Start: 2025-01-22

## 2025-01-22 NOTE — PROGRESS NOTES
Patient Care Team:  Star Garcia APRN as PCP - General (Family Medicine)     Chief complaint: Patient is in today for a physical     Patient is a 59 y.o. female who presents for her yearly physical exam.     HPI Patient in for yearly Physical. Still has shoulder pain, went to [facility], some better. Weight down 7 lbs from November.     Review of Systems   Constitutional:  Negative for appetite change, chills, fatigue and fever.   HENT:  Negative for congestion, ear pain, hearing loss, rhinorrhea, sinus pressure and sore throat.    Eyes:  Negative for itching and visual disturbance.   Respiratory:  Negative for cough, shortness of breath and wheezing.    Cardiovascular:  Negative for chest pain, palpitations and leg swelling.   Gastrointestinal:  Negative for abdominal pain, blood in stool, constipation, diarrhea, nausea and vomiting.   Endocrine: Negative for cold intolerance, heat intolerance, polydipsia, polyphagia and polyuria.   Genitourinary:  Negative for difficulty urinating, dysuria and hematuria.   Musculoskeletal:  Positive for arthralgias (shoulders). Negative for back pain, joint swelling, myalgias and neck pain.   Skin:  Negative for rash and wound.   Allergic/Immunologic: Negative for environmental allergies and food allergies.   Neurological:  Negative for dizziness, light-headedness, numbness and headaches.   Hematological:  Negative for adenopathy. Does not bruise/bleed easily.   Psychiatric/Behavioral:  Negative for dysphoric mood and sleep disturbance. The patient is not nervous/anxious.       History  Past Medical History:   Diagnosis Date    Allergic     DDD (degenerative disc disease), lumbar     History of gallstones     Low back pain     Obesity       Past Surgical History:   Procedure Laterality Date    CHOLECYSTECTOMY      COLONOSCOPY      TOE SURGERY Left     Fifth toe      Allergies   Allergen Reactions    Mobic [Meloxicam] GI Intolerance    Nsaids GI Intolerance      Family  History   Problem Relation Age of Onset    Lung cancer Mother     Cancer Mother         Lung 10/09/1990    Hyperlipidemia Mother          10/09/1990    Stomach cancer Father     Cancer Father         Stomach 1997    Hyperlipidemia Father          1997    Heart disease Brother          7/10/2021    Hyperlipidemia Brother          07/10/2021    Cancer Sister         Colon 2017    Hyperlipidemia Sister          2017    Cancer Maternal Aunt         Pancreatic 2005    Cancer Paternal Aunt          2016    Cancer Paternal Aunt          2022    COPD Sister          2018    Hyperlipidemia Sister          2018    Kidney disease Sister         Dialysis 2018    Hyperlipidemia Sister          2014    Kidney disease Sister         Dialysis 2014    Hyperlipidemia Sister         Living    Breast cancer Neg Hx     Ovarian cancer Neg Hx      Social History     Socioeconomic History    Marital status: Single   Tobacco Use    Smoking status: Never    Smokeless tobacco: Never   Vaping Use    Vaping status: Never Used   Substance and Sexual Activity    Alcohol use: Yes     Alcohol/week: 1.0 standard drink of alcohol     Types: 1 Glasses of wine per week     Comment: Twice a year    Drug use: No    Sexual activity: Not Currently     Partners: Male     Birth control/protection: Abstinence        Current Outpatient Medications:     azelastine (ASTELIN) 0.1 % nasal spray, Administer 2 sprays into the nostril(s) as directed by provider 2 (Two) Times a Day., Disp: 3 each, Rfl: 3    Calcium Carbonate 1500 (600 Ca) MG tablet, Daily., Disp: , Rfl:     Cholecalciferol (vitamin D3) 125 MCG (5000 UT) tablet tablet, Daily., Disp: , Rfl:     estradiol (MINIVELLE, VIVELLE-DOT) 0.05 MG/24HR patch, , Disp: , Rfl:     fluticasone (Flonase Allergy Relief) 50 MCG/ACT nasal spray, 2 sprays into the nostril(s) as directed by provider Daily., Disp:  54.6 mL, Rfl: 3    progesterone (PROMETRIUM) 100 MG capsule, Take 1 capsule by mouth Every Night., Disp: , Rfl:     Semaglutide-Weight Management (Wegovy) 2.4 MG/0.75ML solution auto-injector, Inject 2.4 mg under the skin into the appropriate area as directed 1 (One) Time Per Week., Disp: 3 mL, Rfl: 5    Immunizations   N/A   Prescribed/Refused   Date     Td/Tdap  (Booster Q 10 yrs)   [x]           Prescribed    []     Refused        []           Flu  (Yearly)   [x]        Prescribed    []     Refused        []           Pneumonia      [x]        Prescribed    []     Refused        []                 Hep B     [x]        Prescribed    []     Refused        []           Shingles  (Age 50 and older)   [x]        Prescribed    []     Refused        []           Immunization History   Administered Date(s) Administered    COVID-19 (PFIZER) 12YRS+ (COMIRNATY) 10/15/2023, 10/15/2024    COVID-19 (PFIZER) BIVALENT 12+YRS 10/15/2022    COVID-19 (PFIZER) Purple Cap Monovalent 03/10/2021, 03/31/2021, 10/15/2021    Flu Vaccine Quad PF >36MO 10/15/2021    Fluzone  >6mos 10/06/2020, 10/15/2024    Fluzone (or Fluarix & Flulaval for VFC) >6mos 10/06/2020, 10/15/2021, 10/15/2022    Fluzone Quad >6mos (Multi-dose) 10/06/2020    Hepatitis A 12/31/2018, 07/05/2019    Influenza Injectable Mdck Pf Quad 10/15/2022, 10/15/2023    Influenza, Unspecified 10/25/2018, 10/30/2019, 10/15/2021, 10/15/2022, 10/15/2024    Shingrix 01/03/2020, 06/28/2024, 08/30/2024, 09/24/2024    Tdap 01/01/2010, 01/03/2020, 10/19/2022     Health Maintenance   Topic Date Due    LIPID PANEL  01/19/2025    BMI FOLLOWUP  12/10/2025    ANNUAL PHYSICAL  01/22/2026    MAMMOGRAM  06/07/2026    PAP SMEAR  06/12/2027    COLORECTAL CANCER SCREENING  03/29/2028    TDAP/TD VACCINES (4 - Td or Tdap) 10/19/2032    HEPATITIS C SCREENING  Completed    COVID-19 Vaccine  Completed    INFLUENZA VACCINE  Completed    ZOSTER VACCINE  Completed    Pneumococcal Vaccine 0-64  Aged Out     "    Diabetes  [] Yes  [x] No   N/A      Date     Eye Exam     []             []   Complete     []   Recommended Date:  Where:       Foot Exam     []         []   Complete          Obesity Counseling     []       []   Complete     No results found for: \"HGBA1C\", \"MICROALBUR\"    Additional Testing      Date     Colorectal Screening       []   N/A   [x]   Complete    []   Ordered     Date:    Where:       Pap      []   N/A   []   Complete   [x]   OB/GYN Date:    Where:       Mammogram        []   N/A   []   Complete   [x]  OB/GYN   []   Ordered Date:    Where:     PSA  (Over age 50)    [x]   N/A   []   Complete   []   Ordered Date:    Where:     US Aorta  (For male smokers, age 65)     [x]   N/A   []   Complete   []   Ordered Date:    Where:     CT for Smoker  (Age 55-75, 30 pk yr)    [x]   N/A   []   Complete   []   Ordered Date:    Where:     Bone Density/DEXA      [x]   N/A   []   Complete   []   Ordered Date:    Follow-up:     Hep. C        []   N/A   [x]   Complete   []   Ordered Date:    Where:     Results for orders placed or performed in visit on 05/22/24   Basic Metabolic Panel    Collection Time: 05/22/24  9:23 AM    Specimen: Arm, Left; Blood   Result Value Ref Range    Glucose 96 65 - 99 mg/dL    BUN 15 6 - 20 mg/dL    Creatinine 1.07 (H) 0.57 - 1.00 mg/dL    Sodium 141 136 - 145 mmol/L    Potassium 4.9 3.5 - 5.2 mmol/L    Chloride 106 98 - 107 mmol/L    CO2 24.9 22.0 - 29.0 mmol/L    Calcium 9.3 8.6 - 10.5 mg/dL    BUN/Creatinine Ratio 14.0 7.0 - 25.0    Anion Gap 10.1 5.0 - 15.0 mmol/L    eGFR 60.3 >60.0 mL/min/1.73            /72 (BP Location: Left arm, Patient Position: Sitting, Cuff Size: Adult)   Pulse 83   Resp 18   Ht 167.6 cm (65.98\")   Wt 78.5 kg (173 lb)   LMP 02/22/2021   SpO2 97%   BMI 27.94 kg/m²       Physical Exam  Vitals reviewed.   Constitutional:       Appearance: She is well-developed.   HENT:      Head: Normocephalic and atraumatic.      Right Ear: External ear normal.      " Left Ear: External ear normal.   Eyes:      Pupils: Pupils are equal, round, and reactive to light.   Neck:      Thyroid: No thyromegaly.      Vascular: No JVD.   Cardiovascular:      Rate and Rhythm: Normal rate and regular rhythm.      Heart sounds: Normal heart sounds.   Pulmonary:      Effort: Pulmonary effort is normal. No retractions.      Breath sounds: Normal breath sounds.   Chest:      Chest wall: No mass, lacerations, deformity, swelling, tenderness, crepitus or edema.   Breasts:     Breasts are symmetrical.   Abdominal:      General: Bowel sounds are normal. There is no distension.      Palpations: Abdomen is soft.      Tenderness: There is no abdominal tenderness. There is no guarding.   Genitourinary:     Comments: deferred  Musculoskeletal:         General: Normal range of motion.      Cervical back: Normal range of motion and neck supple.   Lymphadenopathy:      Cervical: No cervical adenopathy.   Skin:     General: Skin is warm and dry.      Findings: No erythema or rash.   Neurological:      Mental Status: She is alert and oriented to person, place, and time.   Psychiatric:         Behavior: Behavior normal.             Counseling provided on weight management.    Diagnoses and all orders for this visit:    Wellness examination  -     Comprehensive Metabolic Panel; Future  -     Lipid Panel; Future  -     TSH Rfx On Abnormal To Free T4; Future  -     CBC (No Diff); Future    Stage 2 chronic kidney disease  -     Comprehensive Metabolic Panel; Future    Dyslipidemia  -     Lipid Panel; Future    Screening for thyroid disorder  -     TSH Rfx On Abnormal To Free T4; Future    Screening for deficiency anemia  -     CBC (No Diff); Future    Seasonal allergies  -     azelastine (ASTELIN) 0.1 % nasal spray; Administer 2 sprays into the nostril(s) as directed by provider 2 (Two) Times a Day.    Eustachian tube dysfunction, bilateral  -     azelastine (ASTELIN) 0.1 % nasal spray; Administer 2 sprays into  the nostril(s) as directed by provider 2 (Two) Times a Day.    The preventative exam has been reviewed in detail.  Counseling/Anticipatory Guidance discussion included discussing nutrition needs, physical activity, healthy weight, injury prevention, misuse of tobacco, alcohol and drugs, sexual behavior, dental health, mental health, immunizations, and needed screenings has been discussed. The patient has been assisted with scheduling healthcare procedures for the coming year and given a written document outlining these recommendations. Age-appropriate screening measures have been ordered for the patient today as indicated above.    Discussed need for weight management.  I recommend they use a weight loss elaine on their phone, eat no more than [id number] bhavna/day, or use intermittent fasting and exercise 30 to 60 minutes 3 times a week.  Discussed weight loss with diet, recommend Weight Watchers or Mediterranean Diet, but stated that whatever method works for this patient is best. The patient agrees with all recommendations at this time. I have discussed a weight loss plan to be determined by this patient.     Will obtain routine testing today and make further recommendations pending results. All results are automatically released to [facility] immediately when they return whether they have been reviewed or not. The patient will be notified about the results, regardless of the findings. If they have not been contacted by the office within 2 weeks after the test has been performed, I want them to contact us to learn about the results.     RV- 6 months    Star Garcia, APRN   1/22/2025   08:20 EST          Please note that portions of this document were completed with a voice recognition program.     At [facility], we believe that sharing information builds trust and better relationships. You are receiving this note because you are receiving care at [facility] or have recently visited. It is possible you will  see health information before a provider has talked with you about it. This kind of information can be easy to misunderstand. To help you fully understand what it means for your health, we urge you to discuss this note with your provider.

## 2025-01-23 LAB
ALBUMIN SERPL-MCNC: 4.2 G/DL (ref 3.5–5.2)
ALBUMIN/GLOB SERPL: 1.4 G/DL
ALP SERPL-CCNC: 59 U/L (ref 39–117)
ALT SERPL W P-5'-P-CCNC: 10 U/L (ref 1–33)
ANION GAP SERPL CALCULATED.3IONS-SCNC: 10.6 MMOL/L (ref 5–15)
AST SERPL-CCNC: 16 U/L (ref 1–32)
BILIRUB SERPL-MCNC: 0.4 MG/DL (ref 0–1.2)
BUN SERPL-MCNC: 14 MG/DL (ref 6–20)
BUN/CREAT SERPL: 13 (ref 7–25)
CALCIUM SPEC-SCNC: 10 MG/DL (ref 8.6–10.5)
CHLORIDE SERPL-SCNC: 102 MMOL/L (ref 98–107)
CHOLEST SERPL-MCNC: 194 MG/DL (ref 0–200)
CO2 SERPL-SCNC: 23.4 MMOL/L (ref 22–29)
CREAT SERPL-MCNC: 1.08 MG/DL (ref 0.57–1)
EGFRCR SERPLBLD CKD-EPI 2021: 59.3 ML/MIN/1.73
GLOBULIN UR ELPH-MCNC: 3 GM/DL
GLUCOSE SERPL-MCNC: 82 MG/DL (ref 65–99)
HDLC SERPL-MCNC: 70 MG/DL (ref 40–60)
LDLC SERPL CALC-MCNC: 109 MG/DL (ref 0–100)
LDLC/HDLC SERPL: 1.53 {RATIO}
POTASSIUM SERPL-SCNC: 4.6 MMOL/L (ref 3.5–5.2)
PROT SERPL-MCNC: 7.2 G/DL (ref 6–8.5)
SODIUM SERPL-SCNC: 136 MMOL/L (ref 136–145)
TRIGL SERPL-MCNC: 83 MG/DL (ref 0–150)
TSH SERPL DL<=0.05 MIU/L-ACNC: 0.71 UIU/ML (ref 0.27–4.2)
VLDLC SERPL-MCNC: 15 MG/DL (ref 5–40)

## 2025-02-24 ENCOUNTER — PATIENT MESSAGE (OUTPATIENT)
Age: 60
End: 2025-02-24
Payer: COMMERCIAL

## 2025-02-24 DIAGNOSIS — E66.3 OVERWEIGHT WITH BODY MASS INDEX (BMI) 25.0-29.9: ICD-10-CM

## 2025-02-25 RX ORDER — SEMAGLUTIDE 2.4 MG/.75ML
2.4 INJECTION, SOLUTION SUBCUTANEOUS WEEKLY
Qty: 3 ML | Refills: 5 | Status: SHIPPED | OUTPATIENT
Start: 2025-02-25

## 2025-03-20 ENCOUNTER — PRIOR AUTHORIZATION (OUTPATIENT)
Age: 60
End: 2025-03-20
Payer: COMMERCIAL

## 2025-03-20 NOTE — TELEPHONE ENCOUNTER
Your plan only covers this drug when you experience benefits from taking the drug and when your results from taking the drug are sent to us. Your doctor needs to send us all  of the following: A) Your weight prior to starting weight loss drug therapy, B) Your weight now, and C) The dates your weights were taken. We have denied your request because  we did not receive all of your results.    Faxed office notes to:  [facility]  [facility] [place] [address]  Phone: [phone]  Fax: [phone]

## 2025-03-26 NOTE — TELEPHONE ENCOUNTER
We’re pleased to let you know that we’ve approved your or your doctor’s request for coverage for Wegovy 2.4MG/0.75ML SC SOAJ. You can now fill your prescription, and it will be covered  according to your plan.  As long as you remain covered by your prescription drug plan and there are no changes to your  plan benefits, this request is approved from 03/26/2025 to 03/26/2026. When this approval  expires, please speak to your doctor about your treatment.

## 2025-05-14 ENCOUNTER — PATIENT MESSAGE (OUTPATIENT)
Age: 60
End: 2025-05-14
Payer: COMMERCIAL

## 2025-05-14 DIAGNOSIS — E66.3 OVERWEIGHT WITH BODY MASS INDEX (BMI) 25.0-29.9: Primary | ICD-10-CM

## 2025-05-14 RX ORDER — BLOOD SUGAR DIAGNOSTIC
1 STRIP MISCELLANEOUS DAILY
Qty: 100 EACH | Refills: 2 | Status: SHIPPED | OUTPATIENT
Start: 2025-05-14

## 2025-05-14 RX ORDER — LIRAGLUTIDE 6 MG/ML
INJECTION, SOLUTION SUBCUTANEOUS
Qty: 3 ML | Refills: 5 | Status: SHIPPED | OUTPATIENT
Start: 2025-05-14

## 2025-05-15 ENCOUNTER — PRIOR AUTHORIZATION (OUTPATIENT)
Age: 60
End: 2025-05-15
Payer: COMMERCIAL

## 2025-05-15 NOTE — TELEPHONE ENCOUNTER
BRAYAN LEAVITT (Key: [id number]) - [id number]  Saxenda 18MG/3ML pen-injectors  status: PA RequestCreated: May 14th, 2025 [id number]: May 15th, 2025

## 2025-05-16 NOTE — TELEPHONE ENCOUNTER
Your PA request has been denied. Additional information will be provided in the denial communication.

## 2025-05-27 ENCOUNTER — OFFICE VISIT (OUTPATIENT)
Age: 60
End: 2025-05-27
Payer: COMMERCIAL

## 2025-05-27 VITALS
OXYGEN SATURATION: 92 % | SYSTOLIC BLOOD PRESSURE: 114 MMHG | HEART RATE: 56 BPM | HEIGHT: 66 IN | WEIGHT: 174 LBS | BODY MASS INDEX: 27.97 KG/M2 | DIASTOLIC BLOOD PRESSURE: 70 MMHG

## 2025-05-27 DIAGNOSIS — E66.3 OVERWEIGHT WITH BODY MASS INDEX (BMI) 25.0-29.9: Primary | ICD-10-CM

## 2025-05-27 DIAGNOSIS — Z13.820 SCREENING FOR OSTEOPOROSIS: ICD-10-CM

## 2025-05-27 RX ORDER — LIRAGLUTIDE 6 MG/ML
INJECTION, SOLUTION SUBCUTANEOUS
Qty: 3 ML | Refills: 5 | Status: SHIPPED | OUTPATIENT
Start: 2025-05-27

## 2025-05-27 NOTE — PROGRESS NOTES
Chief Complaint   Patient presents with    Obesity       Subjective   Tammi Richardson is a 60 y.o. female    Obesity  Pertinent negative symptoms include no chest pain, no chills, no cough, no fatigue, no fever, no headaches, no nausea, no rash, no dysuria, no vomiting and no weakness.   Weight Management  Weight:  Unchanged  Weight loss treatment:  Low calorie, low carb diet, portion control, commercial weight loss program and prescription medications  Medication side effects:  GI discomfort  Treatment barriers:  None  Physical activity tolerance:  Stable  Energy level:  Unchanged    Patient in for follow up on weight, in January 2020 she weighed 205 pounds.  Then while on Saxenda in January 17, 2023 she was down to 141 pounds.  She has since been off of Saxenda and her weight today is back up to 174 pounds.  She did try Wegovy during this time and that was not effective for her. She reports she has a DEXA scan in 2020. Was normal at that time, She does take Calcium and Vitamin D.     Allergies   Allergen Reactions    Mobic [Meloxicam] GI Intolerance    Nsaids GI Intolerance     Past Medical History:   Diagnosis Date    Allergic     Arthritis     DDD (degenerative disc disease), lumbar     History of gallstones     Low back pain     Obesity       Past Surgical History:   Procedure Laterality Date    CHOLECYSTECTOMY      COLONOSCOPY      TOE SURGERY Left     Fifth toe     Social History     Socioeconomic History    Marital status: Single   Tobacco Use    Smoking status: Never    Smokeless tobacco: Never   Vaping Use    Vaping status: Never Used   Substance and Sexual Activity    Alcohol use: Yes     Alcohol/week: 1.0 standard drink of alcohol     Types: 1 Glasses of wine per week     Comment: Twice a year    Drug use: No    Sexual activity: Not Currently     Partners: Male     Birth control/protection: Abstinence        Current Outpatient Medications:     azelastine (ASTELIN) 0.1 % nasal spray, Administer 2  sprays into the nostril(s) as directed by provider 2 (Two) Times a Day., Disp: 3 each, Rfl: 3    Calcium Carbonate 1500 (600 Ca) MG tablet, Daily., Disp: , Rfl:     Cholecalciferol (vitamin D3) 125 MCG (5000 UT) tablet tablet, Daily., Disp: , Rfl:     estradiol (MINIVELLE, VIVELLE-DOT) 0.05 MG/24HR patch, , Disp: , Rfl:     Insulin Pen Needle (Pen Needles) 32G X 6 MM misc, Use 1 each Daily., Disp: 100 each, Rfl: 2    Liraglutide (Saxenda) 18 MG/3ML injection pen, 0.6 mg qd X 7 days, then 1.2 mg qd X 7 days, then 1.8 mg qd X 7, 2.4 mg QD X 7, Then 3.0 mg QD thereafter, Disp: 3 mL, Rfl: 5    progesterone (PROMETRIUM) 100 MG capsule, Take 1 capsule by mouth Every Night., Disp: , Rfl:      Review of Systems   Constitutional:  Positive for unexpected weight change. Negative for chills, fatigue and fever.   Respiratory:  Negative for cough, chest tightness, shortness of breath and wheezing.    Cardiovascular:  Negative for chest pain, palpitations and leg swelling.   Gastrointestinal:  Negative for constipation, diarrhea, nausea and vomiting.   Genitourinary:  Negative for difficulty urinating and dysuria.   Skin:  Negative for color change and rash.   Neurological:  Negative for dizziness, syncope, weakness and headaches.   Psychiatric/Behavioral:  Negative for sleep disturbance.        Objective     Vitals:    05/27/25 1518   BP: 114/70   Pulse: 56   SpO2: 92%         05/27/25  1518   Weight: 78.9 kg (174 lb)     Body mass index is 28.1 kg/m².  Results for orders placed or performed in visit on 01/22/25   Comprehensive Metabolic Panel    Collection Time: 01/22/25  8:32 AM    Specimen: Arm, Left; Blood   Result Value Ref Range    Glucose 82 65 - 99 mg/dL    BUN 14 6 - 20 mg/dL    Creatinine 1.08 (H) 0.57 - 1.00 mg/dL    Sodium 136 136 - 145 mmol/L    Potassium 4.6 3.5 - 5.2 mmol/L    Chloride 102 98 - 107 mmol/L    CO2 23.4 22.0 - 29.0 mmol/L    Calcium 10.0 8.6 - 10.5 mg/dL    Total Protein 7.2 6.0 - 8.5 g/dL    Albumin  4.2 3.5 - 5.2 g/dL    ALT (SGPT) 10 1 - 33 U/L    AST (SGOT) 16 1 - 32 U/L    Alkaline Phosphatase 59 39 - 117 U/L    Total Bilirubin 0.4 0.0 - 1.2 mg/dL    Globulin 3.0 gm/dL    A/G Ratio 1.4 g/dL    BUN/Creatinine Ratio 13.0 7.0 - 25.0    Anion Gap 10.6 5.0 - 15.0 mmol/L    eGFR 59.3 (L) >60.0 mL/min/1.73   Lipid Panel    Collection Time: 01/22/25  8:32 AM    Specimen: Arm, Left; Blood   Result Value Ref Range    Total Cholesterol 194 0 - 200 mg/dL    Triglycerides 83 0 - 150 mg/dL    HDL Cholesterol 70 (H) 40 - 60 mg/dL    LDL Cholesterol  109 (H) 0 - 100 mg/dL    VLDL Cholesterol 15 5 - 40 mg/dL    LDL/HDL Ratio 1.53    TSH Rfx On Abnormal To Free T4    Collection Time: 01/22/25  8:32 AM    Specimen: Arm, Left; Blood   Result Value Ref Range    TSH 0.706 0.270 - 4.200 uIU/mL   CBC (No Diff)    Collection Time: 01/22/25  8:32 AM    Specimen: Arm, Left; Blood   Result Value Ref Range    WBC 7.52 3.40 - 10.80 10*3/mm3    RBC 4.55 3.77 - 5.28 10*6/mm3    Hemoglobin 14.0 12.0 - 15.9 g/dL    Hematocrit 43.5 34.0 - 46.6 %    MCV 95.6 79.0 - 97.0 fL    MCH 30.8 26.6 - 33.0 pg    MCHC 32.2 31.5 - 35.7 g/dL    RDW 11.9 (L) 12.3 - 15.4 %    RDW-SD 41.9 37.0 - 54.0 fl    MPV 10.7 6.0 - 12.0 fL    Platelets 292 140 - 450 10*3/mm3       Physical Exam  Vitals reviewed.   Constitutional:       Appearance: She is well-developed.   HENT:      Head: Normocephalic and atraumatic.   Cardiovascular:      Rate and Rhythm: Normal rate and regular rhythm.      Heart sounds: Normal heart sounds.   Pulmonary:      Effort: Pulmonary effort is normal.      Breath sounds: Normal breath sounds.   Skin:     General: Skin is warm and dry.   Neurological:      Mental Status: She is alert and oriented to person, place, and time.   Psychiatric:         Behavior: Behavior normal.         Assessment & Plan   Problems Addressed this Visit          Endocrine and Metabolic    Overweight with body mass index (BMI) 25.0-29.9 - Primary    Relevant  Medications    Liraglutide (Saxenda) 18 MG/3ML injection pen     Other Visit Diagnoses         Screening for osteoporosis        Relevant Orders    DEXA Bone Density Axial          Diagnoses         Codes Comments      Overweight with body mass index (BMI) 25.0-29.9    -  Primary ICD-10-CM: E66.3  ICD-9-CM: 278.02       Screening for osteoporosis     ICD-10-CM: Z13.820  ICD-9-CM: V82.81         Patient was encouraged to keep me informed of any acute changes, lack of improvement, or any new concerning symptoms.  If patient becomes concerned, or has any worsening symptoms, they are to report either here, urgent treatment, or emergency room. Plan of care discussed with pt. They verbalized understanding and agreement.     Will obtain routine testing today and make further recommendations pending results. All results are automatically released to [employer] immediately when they return whether they have been reviewed or not. The patient will be notified about the results, regardless of the findings. If they have not been contacted by the office within 2 weeks after the test has been performed, I want them to contact us to learn about the results.    Return visit in 3 months    Counseling provided on weight management.    Star Garcia, APRN   5/27/2025   15:57 EDT     Please note that portions of this document were completed with a voice recognition program.     At [facility], we believe that sharing information builds trust and better relationships. You are receiving this note because you are receiving care at [facility] or have recently visited. It is possible you will see health information before a provider has talked with you about it. This kind of information can be easy to misunderstand as it is a medical document. It is intended as [id number] communication. It is written in medical language and may contain abbreviations or verbiage that are unfamiliar. It may appear blunt or direct. Medical documents  are intended to carry relevant information, facts as evident, and the clinical opinion of the practitioner.  To help you fully understand what it means for your health, we urge you to discuss this note with your provider.

## 2025-05-28 ENCOUNTER — PRIOR AUTHORIZATION (OUTPATIENT)
Age: 60
End: 2025-05-28
Payer: COMMERCIAL

## 2025-06-09 ENCOUNTER — TRANSCRIBE ORDERS (OUTPATIENT)
Dept: ADMINISTRATIVE | Facility: HOSPITAL | Age: 60
End: 2025-06-09
Payer: COMMERCIAL

## 2025-06-09 DIAGNOSIS — Z12.31 VISIT FOR SCREENING MAMMOGRAM: Primary | ICD-10-CM

## 2025-06-10 ENCOUNTER — HOSPITAL ENCOUNTER (OUTPATIENT)
Dept: MAMMOGRAPHY | Facility: HOSPITAL | Age: 60
Discharge: HOME | End: 2025-06-10
Admitting: NURSE PRACTITIONER
Payer: COMMERCIAL

## 2025-06-10 DIAGNOSIS — Z12.31 VISIT FOR SCREENING MAMMOGRAM: ICD-10-CM

## 2025-06-10 PROCEDURE — 77067 SCR MAMMO BI INCL CAD: CPT

## 2025-06-10 PROCEDURE — 77063 BREAST TOMOSYNTHESIS BI: CPT
